# Patient Record
Sex: FEMALE | Race: WHITE | NOT HISPANIC OR LATINO | Employment: FULL TIME | URBAN - METROPOLITAN AREA
[De-identification: names, ages, dates, MRNs, and addresses within clinical notes are randomized per-mention and may not be internally consistent; named-entity substitution may affect disease eponyms.]

---
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
## 2019-01-09 ENCOUNTER — OFFICE VISIT (OUTPATIENT)
Dept: FAMILY MEDICINE | Facility: CLINIC | Age: 19
End: 2019-01-09
Payer: COMMERCIAL

## 2019-01-09 VITALS
BODY MASS INDEX: 20.65 KG/M2 | TEMPERATURE: 97.7 F | WEIGHT: 112.2 LBS | SYSTOLIC BLOOD PRESSURE: 110 MMHG | OXYGEN SATURATION: 100 % | HEART RATE: 106 BPM | HEIGHT: 62 IN | RESPIRATION RATE: 16 BRPM | DIASTOLIC BLOOD PRESSURE: 60 MMHG

## 2019-01-09 DIAGNOSIS — N76.0 VAGINITIS AND VULVOVAGINITIS: Primary | ICD-10-CM

## 2019-01-09 LAB
ALBUMIN UR-MCNC: NEGATIVE MG/DL
APPEARANCE UR: ABNORMAL
BILIRUB UR QL STRIP: NEGATIVE
COLOR UR AUTO: YELLOW
GLUCOSE UR STRIP-MCNC: NEGATIVE MG/DL
HGB UR QL STRIP: NEGATIVE
KETONES UR STRIP-MCNC: 80 MG/DL
LEUKOCYTE ESTERASE UR QL STRIP: NEGATIVE
MUCOUS THREADS #/AREA URNS LPF: PRESENT /LPF
NITRATE UR QL: NEGATIVE
PH UR STRIP: 5 PH (ref 5–7)
RBC #/AREA URNS AUTO: 1 /HPF (ref 0–2)
SOURCE: ABNORMAL
SP GR UR STRIP: 1.02 (ref 1–1.03)
SQUAMOUS #/AREA URNS AUTO: 3 /HPF (ref 0–1)
UROBILINOGEN UR STRIP-MCNC: 0 MG/DL (ref 0–2)
WBC #/AREA URNS AUTO: 1 /HPF (ref 0–5)

## 2019-01-09 PROCEDURE — 99213 OFFICE O/P EST LOW 20 MIN: CPT | Performed by: FAMILY MEDICINE

## 2019-01-09 PROCEDURE — 81001 URINALYSIS AUTO W/SCOPE: CPT | Performed by: FAMILY MEDICINE

## 2019-01-09 RX ORDER — FLUCONAZOLE 150 MG/1
150 TABLET ORAL ONCE
Qty: 1 TABLET | Refills: 0 | Status: SHIPPED | OUTPATIENT
Start: 2019-01-09 | End: 2019-04-30

## 2019-01-09 RX ORDER — PIMECROLIMUS 1 %
CREAM (GRAM) TOPICAL
Refills: 1 | COMMUNITY
Start: 2018-07-01 | End: 2019-04-30

## 2019-01-09 RX ORDER — DOXYCYCLINE 100 MG/1
100 CAPSULE ORAL 2 TIMES DAILY
Refills: 2 | COMMUNITY
Start: 2018-10-09 | End: 2019-04-30

## 2019-01-09 ASSESSMENT — PAIN SCALES - GENERAL: PAINLEVEL: NO PAIN (0)

## 2019-01-09 ASSESSMENT — MIFFLIN-ST. JEOR: SCORE: 1237.19

## 2019-01-09 NOTE — NURSING NOTE
Chief Complaint   Patient presents with     Vaginal Problem     x4 days     Health Maintenance Due   Topic Date Due     HPV IMMUNIZATION (1 - Female 3-dose series) 03/07/2010     PHQ-2 Q1 YR  03/07/2011     MENINGITIS IMMUNIZATION (1 - 2-dose series) 03/07/2015     HIV SCREEN (SYSTEM ASSIGNED)  03/07/2017     CHLAMYDIA SCREENING  06/16/2017     DTAP/TDAP/TD IMMUNIZATION (5 - Td) 08/30/2017     INFLUENZA VACCINE (1) 09/01/2018     Chaka Dave, [facility]

## 2019-01-09 NOTE — PROGRESS NOTES
SUBJECTIVE:   Liz Peter is a 18 year old female who presents to clinic today for the following health issues:    Vaginal Symptoms  Onset: 4 days    Description:  Vaginal Discharge: none   Itching (Pruritis): YES  Burning sensation:  YES- when urinating  Odor: no     Accompanying Signs & Symptoms:  Pain with Urination: YES- burning  Abdominal Pain: no   Fever: no     History:   Sexually active: YES  New Partner: no   Possibility of Pregnancy:  She has the depo injection    Precipitating factors:   Recent Antibiotic Use: YES- doxycycline    Alleviating factors:  Coconut oil helped with burning    Therapies Tried and outcome: coconut oil but it did not get rid of it    Liz is here today with a concern of possible yeast infection vs bladder infection.  Nursing notes above reviewed and confirmed with patient.  Has 4-day history is of intense vaginal itching and dyspareunia and they are not getting any better.  She feels irritated and itchy deep inside the birth canal.  Minimal irritation in the outside even with cleaning.  Never had this before.  No abnormal discharge however.  No strong or fishy odor or cartilages consistent discharge.  Feel slight burning sensation with urination.  No urine frequency or urgency.  No obvious hematuria.  No low back or abdominal pain.  She is on Depo-Provera injection and therefore has no menstrual bleeding.  Has been on doxycycline twice a day for skin infection a month and still has another month at the take.  No concern of STD or its risk - been in a monogamous relationship with her boyfriend of 3 years.  No N/V/D/C.  No fever or chills.  No otherwise healthy and no has other concerns.    Problem list and histories reviewed & adjusted, as indicated.  Additional history: as documented    Current Outpatient Medications   Medication Sig Dispense Refill     doxycycline hyclate (VIBRAMYCIN) 100 MG capsule Take 100 mg by mouth 2 times daily  2     ELIDEL 1 % external cream APPLY  "TO AFFECTED AREA(S) THIN LAYER TOPICALLY TWO TIMES A DAY RUB IN GENTLY AND COMPLETELY  1     medroxyPROGESTERone (DEPO-PROVERA) 150 MG/ML injection Inject 1 mL (150 mg) into the muscle every 3 months 3 mL 3       Reviewed and updated as needed this visit by clinical staff  Tobacco  Allergies  Meds  Soc Hx      Reviewed and updated as needed this visit by Provider         ROS:  Constitutional, HEENT, cardiovascular, pulmonary, gi and gu systems are negative, except as otherwise noted.    OBJECTIVE:     /60 (BP Location: Right arm, Patient Position: Sitting, Cuff Size: Adult Regular)   Pulse 106   Temp 97.7  F (36.5  C) (Temporal)   Resp 16   Ht 1.575 m (5' 2\")   Wt 50.9 kg (112 lb 3.2 oz)   SpO2 100%   Breastfeeding? No   BMI 20.52 kg/m    Body mass index is 20.52 kg/m .  GENERAL: healthy, alert and no distress  RESP: lungs clear to auscultation - no rales, rhonchi or wheezes  CV: regular rate and rhythm, no murmur.  ABDOMEN: soft, nontender, nondistended, no palpable masses or organomegaly with normal bowel sounds.  No CVA tenderness   (female): Offered and recommended but she wanted to hold it off for now.    Diagnostic Test Results:  Results for orders placed or performed in visit on 01/09/19 (from the past 24 hour(s))   UA with Microscopic reflex to Culture ([place]; [facility])   Result Value Ref Range    Color Urine Yellow     Appearance Urine Slightly Cloudy     Glucose Urine Negative NEG^Negative mg/dL    Bilirubin Urine Negative NEG^Negative    Ketones Urine 80 (A) NEG^Negative mg/dL    Specific Gravity Urine 1.025 1.003 - 1.035    Blood Urine Negative NEG^Negative    pH Urine 5.0 5.0 - 7.0 pH    Protein Albumin Urine Negative NEG^Negative mg/dL    Urobilinogen mg/dL 0.0 0.0 - 2.0 mg/dL    Nitrite Urine Negative NEG^Negative    Leukocyte Esterase Urine Negative NEG^Negative    Source Unspecified Urine     WBC Urine 1 0 - 5 /HPF    RBC Urine 1 0 - 2 /HPF    Squamous Epithelial " /HPF Urine 3 (H) 0 - 1 /HPF    Mucous Urine Present (A) NEG^Negative /LPF       ASSESSMENT/PLAN:       ICD-10-CM    1. Vaginitis and vulvovaginitis N76.0 fluconazole (DIFLUCAN) 150 MG tablet     UA with Microscopic reflex to Culture (Suzanne Das; [facility])     CANCELED: UA with Microscopic reflex to Culture     Informed her that based on the clinical presentations and her being on antibiotic in the last month, most likely she has yeast vaginitis.  The UA was normal, unlikely UTI.  Discussed with her about the option of getting a pelvic exam with a wet prep versus treat empirically for yeast infection.  Pros and cons of each option discussed.  Informed her that, if she not respond to the treatment for yeast infection, she then will have to come back for a pelvic exam if she chose not to have one done today.  She elected to be treated empirically for yeast infection with Diflucan; side effect discussed.  Also recommend to consider tested for STD if the symptoms not improve or persist with the treatment.  She has no concern about it but willing to consider that at that time.  She is on reliable birth control method, I do not feel the need to check for her pregnancy test today.  Encouraged her to avoid excessive wiping or cleaning to the private area as it worsen the irritation.  Follow-up if the symptoms persist or get worse with the Diflucan.  She feels comfortable with the plan and all of her questions were answered.    Elsie Paz Mai, MD  [facility]

## 2019-03-08 ENCOUNTER — ALLIED HEALTH/NURSE VISIT (OUTPATIENT)
Dept: FAMILY MEDICINE | Facility: CLINIC | Age: 19
End: 2019-03-08
Payer: COMMERCIAL

## 2019-03-08 PROCEDURE — 96372 THER/PROPH/DIAG INJ SC/IM: CPT

## 2019-03-08 NOTE — PROGRESS NOTES
Prior to injection, verified patient identity using patient's name and date of birth.  Due to injection administration, patient instructed to remain in clinic for 15 minutes  afterwards, and to report any adverse reaction to me immediately.    BP: Data Unavailable    LAST PAP/EXAM: No results found for: PAP  URINE HCG:not indicated    NEXT INJECTION DUE: 5/24/19 - 6/7/19       Drug Amount Wasted:  None.  Vial/Syringe: Single dose vial  Expiration Date:  05/2020

## 2019-04-30 ENCOUNTER — OFFICE VISIT (OUTPATIENT)
Dept: URGENT CARE | Facility: RETAIL CLINIC | Age: 19
End: 2019-04-30
Payer: COMMERCIAL

## 2019-04-30 VITALS
OXYGEN SATURATION: 100 % | HEART RATE: 84 BPM | SYSTOLIC BLOOD PRESSURE: 105 MMHG | TEMPERATURE: 98.6 F | DIASTOLIC BLOOD PRESSURE: 70 MMHG

## 2019-04-30 DIAGNOSIS — T14.8XXA INFECTED ABRASION: Primary | ICD-10-CM

## 2019-04-30 DIAGNOSIS — L08.9 INFECTED ABRASION: Primary | ICD-10-CM

## 2019-04-30 PROCEDURE — 99213 OFFICE O/P EST LOW 20 MIN: CPT | Performed by: INTERNAL MEDICINE

## 2019-04-30 RX ORDER — SULFAMETHOXAZOLE/TRIMETHOPRIM 800-160 MG
1 TABLET ORAL 2 TIMES DAILY
Qty: 20 TABLET | Refills: 0 | Status: SHIPPED | OUTPATIENT
Start: 2019-04-30 | End: 2019-09-06

## 2019-04-30 RX ORDER — MUPIROCIN 20 MG/G
OINTMENT TOPICAL 3 TIMES DAILY
Qty: 22 G | Refills: 0 | Status: SHIPPED | OUTPATIENT
Start: 2019-04-30 | End: 2019-09-06

## 2019-04-30 NOTE — PROGRESS NOTES
"[facility] Care Progress Note        Gabriel Aquino MD, MPH  04/30/2019    Liz Peter is a pleasant  19  year old female seen for mild pain and a mildly pruritic skin lesion involving right ear lobe w/o drainage \" a week and a half ago\" . There has not been any change in the diet or new detergent, soap or toiletries.  No new medications. No insect bite is referred. However the patient's boyfriend has suggested an insect bite ( possibly spider bite) when the patient symptoms began. No fever or chills and no recent illness. No cough or shortness of breath or wheezing is referred.  No nausea, vomiting or diarrhea.  No arthralgia or myalgia.  No tongue, lip or mouth swelling or swallowing problems are referred.    Physical Exam   /70   Pulse 84   Temp 98.6  F (37  C) (Oral)   SpO2 100%      Constitutional: Patient is in no distress The patient is pleasant and cooperative.   HEENT: Head:  Head is atraumatic, normocephalic.    Eyes: Pupils are equal, round and reactive to light and accomodation.  Sclera is non-icteric. No conjunctival injection, or exudate noted. Extraocular motion is intact. Visual acuity is intact bilaterally.  Ears:  External acoustic canals are patent and clear.  There is no erythema and bulging( exudate)  of bilateral tympanic membranes. Please see SKIN SECTION for more detail.  Nose:  No Nasal congestion w/o drainage or mucosal ulceration is noted.  Throat:  Oral mucosa is moist.  No oral lesions are noted.  No posterior pharyngeal hyperemia or exudate noted.     Neck Supple.  There is no cervical lymphadenopathy. No postauricular lymphadenopathy noted. No nuchal rigidity noted.  There is no meningismus.     Cardiovascular: Heart is regular to rate and rhythm.  No murmur is noted.     Chest. Chest Symmetrical, no soft tissues, swelling, or tenderness upon palpation   Lungs: Clear in the anterior and posterior pulmonary fields.   Abdomen: Soft and non-tender.    Back " No flank tenderness is noted.   Extremeties No edema, no calf tenderness.   Neuro: No focal deficit.   Skin No petechiae or purpura is noted.  There is a small area of skin abrasion involving the upper margin of the right ear lobe specifically the upper segment  of the brandin with surrounding erythema  W/o fulminant cellulitis. No current cutaneous abscess is noted and there is no evidence of lymphangitis. No drainage of exudative ( purulent ) materilal is noted. No ulceration. No pain is elicited upon gentle tug on the right tragus/pinna. There is no swelling of the right EAC proper and no erythema or exudate of right TM is noted. No right middle ear effusion is noted. No swelling or erythema of the Mastoid processes noted. Hearing acuity is intact bilaterally.       Mood Normal         Assessment & Plan       Infected abrasion of right Ear lobe ( brandin) with impending cellulitis ; No cutaneous abscess currently.    - mupirocin (BACTROBAN) 2 % external ointment; Apply topically 3 times daily Apply topically 3 times a day  By mouth for 1 week.  Dispense: 22 g; Refill: 0  - sulfamethoxazole-trimethoprim (BACTRIM DS/SEPTRA DS) 800-160 MG tablet; Take 1 tablet by mouth 2 times daily for 10 days  Dispense: 20 tablet; Refill: 0     Follow-up with your PCP in 2-3 days, earlier if symptoms worsen.  Advised to wash the area of abrasion of right ear lobe w soap and water gently and apply warm wash cloth ( avoid applying HOT wash cloth lest burning could ensue) 2-3 times a day.   Acetaminophen 650 mg or Ibuprofen 400 mg by mouth every 6 hours as needed.

## 2019-05-24 ENCOUNTER — ALLIED HEALTH/NURSE VISIT (OUTPATIENT)
Dept: FAMILY MEDICINE | Facility: CLINIC | Age: 19
End: 2019-05-24
Payer: COMMERCIAL

## 2019-05-24 DIAGNOSIS — Z30.013 ENCOUNTER FOR INITIAL PRESCRIPTION OF INJECTABLE CONTRACEPTIVE: Primary | ICD-10-CM

## 2019-05-24 PROCEDURE — 96372 THER/PROPH/DIAG INJ SC/IM: CPT

## 2019-05-24 NOTE — PROGRESS NOTES
Prior to injection, verified patient identity using patient's name and date of birth.  Due to injection administration, patient instructed to remain in clinic for 15 minutes  afterwards, and to report any adverse reaction to me immediately.    BP: Data Unavailable    LAST PAP/EXAM: No results found for: PAP  URINE HCG:not indicated    NEXT INJECTION DUE: 8/9/19 - 8/23/19         Drug Amount Wasted:  None.  Vial/Syringe: Single dose vial  Expiration Date:  04/2020  Emy Dean CMA

## 2019-08-16 ENCOUNTER — ALLIED HEALTH/NURSE VISIT (OUTPATIENT)
Dept: FAMILY MEDICINE | Facility: CLINIC | Age: 19
End: 2019-08-16
Payer: MEDICAID

## 2019-08-16 DIAGNOSIS — N94.6 DYSMENORRHEA: Primary | ICD-10-CM

## 2019-08-16 PROCEDURE — 99207 ZZC NO CHARGE NURSE ONLY: CPT

## 2019-08-16 PROCEDURE — 96372 THER/PROPH/DIAG INJ SC/IM: CPT

## 2019-08-16 RX ORDER — MEDROXYPROGESTERONE ACETATE 150 MG/ML
150 INJECTION, SUSPENSION INTRAMUSCULAR ONCE
Status: COMPLETED | OUTPATIENT
Start: 2019-08-16 | End: 2019-08-16

## 2019-08-16 RX ORDER — MEDROXYPROGESTERONE ACETATE 150 MG/ML
150 INJECTION, SUSPENSION INTRAMUSCULAR ONCE
Status: CANCELLED | OUTPATIENT
Start: 2019-08-16

## 2019-08-16 RX ADMIN — MEDROXYPROGESTERONE ACETATE 150 MG: 150 INJECTION, SUSPENSION INTRAMUSCULAR at 09:08

## 2019-08-16 NOTE — PROGRESS NOTES
.Clinic Administered Medication Documentation      Depo Provera Documentation    Prior to injection, verified patient identity using patient's name and date of birth. Medication was administered. Please see MAR and medication order for additional information. Patient instructed to remain in clinic for 15 minutes and report any adverse reaction to staff immediately .    BP: Data Unavailable    LAST PAP/EXAM: No results found for: PAP  URINE HCG:not indicated    NEXT INJECTION DUE: 11/1/19 - 11/15/19    Was entire vial of medication used? Yes  Vial/Syringe: Single dose vial  Expiration Date:  02/20  Right Deltoid    Patient was informed that she is due for physical. Offered to schedule an appointment for the patient but she declined. She stated that she will call in to schedule the appointment.     Chaka Dave, CMA

## 2019-09-06 ENCOUNTER — OFFICE VISIT (OUTPATIENT)
Dept: FAMILY MEDICINE | Facility: CLINIC | Age: 19
End: 2019-09-06
Payer: MEDICAID

## 2019-09-06 VITALS
TEMPERATURE: 98.2 F | RESPIRATION RATE: 20 BRPM | HEIGHT: 62 IN | HEART RATE: 74 BPM | BODY MASS INDEX: 20.24 KG/M2 | SYSTOLIC BLOOD PRESSURE: 106 MMHG | OXYGEN SATURATION: 99 % | WEIGHT: 110 LBS | DIASTOLIC BLOOD PRESSURE: 60 MMHG

## 2019-09-06 DIAGNOSIS — Z30.42 ENCOUNTER FOR SURVEILLANCE OF INJECTABLE CONTRACEPTIVE: ICD-10-CM

## 2019-09-06 DIAGNOSIS — Z11.3 SCREENING EXAMINATION FOR VENEREAL DISEASE: ICD-10-CM

## 2019-09-06 DIAGNOSIS — Z00.00 ROUTINE GENERAL MEDICAL EXAMINATION AT A HEALTH CARE FACILITY: Primary | ICD-10-CM

## 2019-09-06 PROCEDURE — 87591 N.GONORRHOEAE DNA AMP PROB: CPT | Performed by: FAMILY MEDICINE

## 2019-09-06 PROCEDURE — 99395 PREV VISIT EST AGE 18-39: CPT | Performed by: FAMILY MEDICINE

## 2019-09-06 PROCEDURE — 87491 CHLMYD TRACH DNA AMP PROBE: CPT | Performed by: FAMILY MEDICINE

## 2019-09-06 RX ORDER — MEDROXYPROGESTERONE ACETATE 150 MG/ML
150 INJECTION, SUSPENSION INTRAMUSCULAR
Status: ACTIVE | OUTPATIENT
Start: 2019-11-01

## 2019-09-06 ASSESSMENT — ENCOUNTER SYMPTOMS
SORE THROAT: 0
EYE PAIN: 0
HEARTBURN: 0
MYALGIAS: 0
SHORTNESS OF BREATH: 0
HEADACHES: 0
NAUSEA: 0
COUGH: 0
HEMATOCHEZIA: 0
ARTHRALGIAS: 0
HEMATURIA: 0
FEVER: 0
BREAST MASS: 0
DIARRHEA: 0
NERVOUS/ANXIOUS: 0
CHILLS: 0
DYSURIA: 0
ABDOMINAL PAIN: 0
WEAKNESS: 0
CONSTIPATION: 0
PARESTHESIAS: 0
DIZZINESS: 0
FREQUENCY: 0
PALPITATIONS: 0
JOINT SWELLING: 0

## 2019-09-06 ASSESSMENT — MIFFLIN-ST. JEOR: SCORE: 1222.21

## 2019-09-06 ASSESSMENT — PAIN SCALES - GENERAL: PAINLEVEL: NO PAIN (0)

## 2019-09-06 NOTE — PROGRESS NOTES
SUBJECTIVE:   CC: Liz Peter is an 19 year old woman who presents for preventive health visit.     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          Today's PHQ-2 Score:   PHQ-2 ( 1999 Pfizer) 9/6/2019   Q1: Little interest or pleasure in doing things 0   Q2: Feeling down, depressed or hopeless 0   PHQ-2 Score 0   Q1: Little interest or pleasure in doing things Not at all   Q2: Feeling down, depressed or hopeless Not at all   PHQ-2 Score 0       Abuse: Current or Past(Physical, Sexual or Emotional)- No  Do you feel safe in your environment? Yes    Social History     Tobacco Use     Smoking status: Never Smoker     Smokeless tobacco: Never Used   Substance Use Topics     Alcohol use: No     Alcohol/week: 0.0 oz         Alcohol Use 9/6/2019   Prescreen: >3 drinks/day or >7 drinks/week? No       Reviewed orders with patient.  Reviewed health maintenance and updated orders accordingly - Yes      Mammogram not appropriate for this patient based on age.    Pertinent mammograms are reviewed under the imaging tab.  History of abnormal Pap smear: NO - under age 21, PAP not appropriate for age     Reviewed and updated as needed this visit by clinical staff  Tobacco  Allergies  Meds  Problems  Med Hx  Surg Hx  Fam Hx  Soc Hx          Reviewed and updated as needed this visit by Provider  Tobacco  Allergies  Meds  Problems  Med Hx  Surg Hx  Fam Hx            Review of Systems   Constitutional: Negative for chills and fever.   HENT: Negative  "for congestion, ear pain, hearing loss and sore throat.    Eyes: Negative for pain and visual disturbance.   Respiratory: Negative for cough and shortness of breath.    Cardiovascular: Negative for chest pain, palpitations and peripheral edema.   Gastrointestinal: Negative for abdominal pain, constipation, diarrhea, heartburn, hematochezia and nausea.   Breasts:  Negative for tenderness, breast mass and discharge.   Genitourinary: Negative for dysuria, frequency, genital sores, hematuria, pelvic pain, urgency, vaginal bleeding and vaginal discharge.   Musculoskeletal: Negative for arthralgias, joint swelling and myalgias.   Skin: Negative for rash.   Neurological: Negative for dizziness, weakness, headaches and paresthesias.   Psychiatric/Behavioral: Negative for mood changes. The patient is not nervous/anxious.           OBJECTIVE:   /60   Pulse 74   Temp 98.2  F (36.8  C) (Temporal)   Resp 20   Ht 1.575 m (5' 2\")   Wt 49.9 kg (110 lb)   LMP 03/06/2019   SpO2 99%   Breastfeeding? No   BMI 20.12 kg/m    Physical Exam   Constitutional: She is oriented to person, place, and time. Vital signs are normal. She appears well-developed and well-nourished. No distress.   HENT:   Right Ear: Hearing, tympanic membrane, external ear and ear canal normal.   Left Ear: Hearing, tympanic membrane, external ear and ear canal normal.   Nose: Nose normal.   Mouth/Throat: Uvula is midline, oropharynx is clear and moist and mucous membranes are normal. No oropharyngeal exudate or posterior oropharyngeal erythema.   Eyes: Pupils are equal, round, and reactive to light. Conjunctivae, EOM and lids are normal. Right eye exhibits no discharge. Left eye exhibits no discharge.   Neck: Normal range of motion. Neck supple. No tracheal deviation present. No thyromegaly present.   Cardiovascular: Normal rate, regular rhythm, S1 normal, S2 normal, normal heart sounds and normal pulses. Exam reveals no S3, no S4 and no friction rub. " "  No murmur heard.  Pulmonary/Chest: Effort normal and breath sounds normal. No respiratory distress. She has no wheezes. She has no rales.   Abdominal: Soft. Normal appearance and bowel sounds are normal. She exhibits no mass. There is no hepatosplenomegaly. There is no tenderness.   Musculoskeletal: Normal range of motion. She exhibits no edema.   Lymphadenopathy:     She has no cervical adenopathy.        Right: No supraclavicular adenopathy present.        Left: No supraclavicular adenopathy present.   Neurological: She is alert and oriented to person, place, and time. She has normal strength and normal reflexes. No cranial nerve deficit or sensory deficit. She exhibits normal muscle tone.   Skin: Skin is warm, dry and intact. No rash noted.   Psychiatric: She has a normal mood and affect. Judgment and thought content normal. Cognition and memory are normal.             ASSESSMENT/PLAN:       ICD-10-CM    1. Routine general medical examination at a health care facility Z00.00    2. Encounter for surveillance of injectable contraceptive Z30.42 medroxyPROGESTERone (DEPO-PROVERA) injection 150 mg   3. Screening examination for venereal disease Z11.3 NEISSERIA GONORRHOEA PCR     CHLAMYDIA TRACHOMATIS PCR       COUNSELING:  Reviewed preventive health counseling, as reflected in patient instructions    Estimated body mass index is 20.12 kg/m  as calculated from the following:    Height as of this encounter: 1.575 m (5' 2\").    Weight as of this encounter: 49.9 kg (110 lb).         reports that she has never smoked. She has never used smokeless tobacco.      Counseling Resources:  ATP IV Guidelines  Pooled Cohorts Equation Calculator  Breast Cancer Risk Calculator  FRAX Risk Assessment  ICSI Preventive Guidelines  Dietary Guidelines for Americans, 2010  USDA's MyPlate  ASA Prophylaxis  Lung CA Screening    Arcadio Malone MD  [facility]  "

## 2019-09-09 ENCOUNTER — ALLIED HEALTH/NURSE VISIT (OUTPATIENT)
Dept: FAMILY MEDICINE | Facility: CLINIC | Age: 19
End: 2019-09-09
Payer: MEDICAID

## 2019-09-09 DIAGNOSIS — Z23 NEED FOR PROPHYLACTIC VACCINATION AND INOCULATION AGAINST INFLUENZA: Primary | ICD-10-CM

## 2019-09-09 DIAGNOSIS — Z11.1 SCREENING EXAMINATION FOR PULMONARY TUBERCULOSIS: ICD-10-CM

## 2019-09-09 PROCEDURE — 90686 IIV4 VACC NO PRSV 0.5 ML IM: CPT

## 2019-09-09 PROCEDURE — 90471 IMMUNIZATION ADMIN: CPT

## 2019-09-09 PROCEDURE — 86580 TB INTRADERMAL TEST: CPT

## 2019-09-09 PROCEDURE — 99207 ZZC NO CHARGE NURSE ONLY: CPT

## 2019-09-10 DIAGNOSIS — A74.9 CHLAMYDIA: Primary | ICD-10-CM

## 2019-09-10 LAB
C TRACH DNA SPEC QL NAA+PROBE: POSITIVE
N GONORRHOEA DNA SPEC QL NAA+PROBE: NEGATIVE
SPECIMEN SOURCE: ABNORMAL
SPECIMEN SOURCE: NORMAL

## 2019-09-10 RX ORDER — AZITHROMYCIN 500 MG/1
1000 TABLET, FILM COATED ORAL ONCE
Qty: 2 TABLET | Refills: 0 | Status: SHIPPED | OUTPATIENT
Start: 2019-09-10 | End: 2019-09-10

## 2019-09-10 NOTE — TELEPHONE ENCOUNTER
Liz returns call and message per Dr Malone is relayed to her. Liz states understanding and did not have any other questions or concerns.    [facility] [place].

## 2019-09-10 NOTE — TELEPHONE ENCOUNTER
Patient has positive Clamydia test.  Needs azithromycin treatment.  I attempted to call but there was no identifying information for me to leave a voicemail.  Will need to try back again later.      I will need to know what pharmacy to send her prescription to.  She also needs to know that any of her sex partners will need to be notified and that they will need treatment and she and any partners will need 7 days post treatment before having sex again.    Will have staff try contacting her again later.    Arcadio Malone MD

## 2019-09-12 ENCOUNTER — ALLIED HEALTH/NURSE VISIT (OUTPATIENT)
Dept: FAMILY MEDICINE | Facility: CLINIC | Age: 19
End: 2019-09-12
Payer: MEDICAID

## 2019-09-12 DIAGNOSIS — Z11.1 SCREENING EXAMINATION FOR PULMONARY TUBERCULOSIS: Primary | ICD-10-CM

## 2019-09-12 LAB
PPDINDURATION: 0 MM (ref 0–5)
PPDREDNESS: 0 MM

## 2019-09-12 PROCEDURE — 99207 ZZC NO CHARGE NURSE ONLY: CPT

## 2019-09-16 ENCOUNTER — ALLIED HEALTH/NURSE VISIT (OUTPATIENT)
Dept: FAMILY MEDICINE | Facility: CLINIC | Age: 19
End: 2019-09-16
Payer: MEDICAID

## 2019-09-16 DIAGNOSIS — Z11.1 SCREENING EXAMINATION FOR PULMONARY TUBERCULOSIS: Primary | ICD-10-CM

## 2019-09-16 PROCEDURE — 86580 TB INTRADERMAL TEST: CPT

## 2019-09-16 PROCEDURE — 99207 ZZC NO CHARGE NURSE ONLY: CPT

## 2019-09-16 NOTE — PROGRESS NOTES
The patient is asked the following questions today and these are her answers:    -Have you had a mantoux administered in the past 30 days?    Yes  -Have you had a previous positive Mantoux.  No  -Have you received BCG in the past.  No  -Have you had a live vaccine  (MMR, Varicella, OPV, Yellow Fever) in the last 6 weeks.  No  -Have you had and active  viral or bacterial infection in the past 6 weeks.  No  -Have you received corticosteroids or immunosuppressive agents in the past 6 weeks.  No  -Have you been diagnosed with HIV?  No  -Do you have a malignancy?  No    Mantoux Questionnaire: answers were all negative but one.    This is a 2 step, so patient had one done on 9/9/2019. Okay to give.

## 2019-09-18 ENCOUNTER — ALLIED HEALTH/NURSE VISIT (OUTPATIENT)
Dept: FAMILY MEDICINE | Facility: CLINIC | Age: 19
End: 2019-09-18
Payer: MEDICAID

## 2019-09-18 DIAGNOSIS — Z11.1 SCREENING EXAMINATION FOR PULMONARY TUBERCULOSIS: Primary | ICD-10-CM

## 2019-09-18 LAB
PPDINDURATION: 0 MM (ref 0–5)
PPDREDNESS: 0 MM

## 2019-09-18 PROCEDURE — 99207 ZZC NO CHARGE NURSE ONLY: CPT

## 2019-09-23 ENCOUNTER — ALLIED HEALTH/NURSE VISIT (OUTPATIENT)
Dept: FAMILY MEDICINE | Facility: CLINIC | Age: 19
End: 2019-09-23
Payer: MEDICAID

## 2019-09-23 DIAGNOSIS — Z11.1 SCREENING EXAMINATION FOR PULMONARY TUBERCULOSIS: Primary | ICD-10-CM

## 2019-09-23 PROCEDURE — 86580 TB INTRADERMAL TEST: CPT

## 2019-09-23 PROCEDURE — 99207 ZZC NO CHARGE NURSE ONLY: CPT

## 2019-09-23 NOTE — PROGRESS NOTES
The patient is asked the following questions today and these are her answers:    -Have you had a mantoux administered in the past 30 days?    Yes  -Have you had a previous positive Mantoux.  No  -Have you received BCG in the past.  No  -Have you had a live vaccine  (MMR, Varicella, OPV, Yellow Fever) in the last 6 weeks.  No  -Have you had and active  viral or bacterial infection in the past 6 weeks.  No  -Have you received corticosteroids or immunosuppressive agents in the past 6 weeks.  No  -Have you been diagnosed with HIV?  No  -Do you have a malignancy?  No    Mantoux Questionnaire: was positive for at least one answer.  Notified had mantoux administered on 9/16/19.

## 2019-09-26 ENCOUNTER — ALLIED HEALTH/NURSE VISIT (OUTPATIENT)
Dept: FAMILY MEDICINE | Facility: CLINIC | Age: 19
End: 2019-09-26
Payer: MEDICAID

## 2019-09-26 DIAGNOSIS — Z11.1 SCREENING EXAMINATION FOR PULMONARY TUBERCULOSIS: Primary | ICD-10-CM

## 2019-09-26 LAB
PPDINDURATION: 0 MM (ref 0–5)
PPDREDNESS: 0 MM

## 2019-09-26 PROCEDURE — 99207 ZZC NO CHARGE NURSE ONLY: CPT

## 2019-11-11 ENCOUNTER — ALLIED HEALTH/NURSE VISIT (OUTPATIENT)
Dept: FAMILY MEDICINE | Facility: CLINIC | Age: 19
End: 2019-11-11
Payer: COMMERCIAL

## 2019-11-11 DIAGNOSIS — Z30.42 ENCOUNTER FOR SURVEILLANCE OF INJECTABLE CONTRACEPTIVE: Primary | ICD-10-CM

## 2019-11-11 PROCEDURE — 96372 THER/PROPH/DIAG INJ SC/IM: CPT

## 2019-11-11 PROCEDURE — 99207 ZZC NO CHARGE NURSE ONLY: CPT

## 2019-11-11 RX ADMIN — MEDROXYPROGESTERONE ACETATE 150 MG: 150 INJECTION, SUSPENSION INTRAMUSCULAR at 13:08

## 2019-11-11 NOTE — PROGRESS NOTES
Clinic Administered Medication Documentation    MEDICATION LIST:   Depo Provera Documentation    Prior to injection, verified patient identity using patient's name and date of birth. Medication was administered. Please see MAR and medication order for additional information. Patient instructed to remain in clinic for 15 minutes and report any adverse reaction to staff immediately .    BP: Data Unavailable    LAST PAP/EXAM: No results found for: PAP  URINE HCG:not indicated    NEXT INJECTION DUE: 1/27/20 - 2/10/20    Was entire vial of medication used? Yes  Vial/Syringe: Single dose vial  Expiration Date:  03/21  Location: left Deltoid    Chaka Dave CMA

## 2020-01-03 ENCOUNTER — OFFICE VISIT (OUTPATIENT)
Dept: URGENT CARE | Facility: RETAIL CLINIC | Age: 20
End: 2020-01-03
Payer: COMMERCIAL

## 2020-01-03 VITALS
HEART RATE: 85 BPM | TEMPERATURE: 98.3 F | DIASTOLIC BLOOD PRESSURE: 73 MMHG | RESPIRATION RATE: 16 BRPM | OXYGEN SATURATION: 100 % | SYSTOLIC BLOOD PRESSURE: 120 MMHG

## 2020-01-03 DIAGNOSIS — J02.9 ACUTE PHARYNGITIS, UNSPECIFIED ETIOLOGY: Primary | ICD-10-CM

## 2020-01-03 LAB — S PYO AG THROAT QL IA.RAPID: NORMAL

## 2020-01-03 PROCEDURE — 99213 OFFICE O/P EST LOW 20 MIN: CPT | Performed by: INTERNAL MEDICINE

## 2020-01-03 PROCEDURE — 87081 CULTURE SCREEN ONLY: CPT | Performed by: INTERNAL MEDICINE

## 2020-01-03 PROCEDURE — 87880 STREP A ASSAY W/OPTIC: CPT | Mod: QW | Performed by: INTERNAL MEDICINE

## 2020-01-03 NOTE — LETTER
[facility]  [address] [facility] [address]  Phone: [phone]    January 3, 2020        Liz Peter  [id number] [facility] [address]          To whom it may concern:    RE: Liz Peter    Patient was seen and treated today at our clinic. Advised to rest today and the next 2 days.    Please contact me for questions or concerns.      Sincerely,        Gabriel Aquino MD,MPH

## 2020-01-03 NOTE — PROGRESS NOTES
[facility] Care Progress Note        Gabriel Aquino MD, MPH  01/03/2020        History:      Liz Peter is a pleasant 19 year old year old female with a chief complaint of nasal congestion and sore throat for 5 days.  No fever or chills.   No dyspnea or wheezing.   No smoking history.   No headache or neck pain.  No GI or  symptoms.   No MSK symptoms.  No rash.         Assessment and Plan:        - RAPID STREP SCREEN: negative.  - BETA STREP GROUP A R/O CULTURE   URI:  Discussed supportive care with the patient  Advised to increase fluid intake and rest.  Patient was advised to use throat lozenges and gargle with salt water for symptomatic relief.  Tylenol 650 mg po for pain q 6 hours as needed.  F/u w PCP in 4-5 days, earlier if symptoms worsen.                   Physical Exam:      /73 (BP Location: Right arm, Patient Position: Sitting, Cuff Size: Adult Regular)   Pulse 85   Temp 98.3  F (36.8  C) (Oral)   Resp 16   SpO2 100%      Constitutional: Patient is in no distress The patient is pleasant and cooperative.   HEENT: Head:  Head is atraumatic, normocephalic.    Eyes: Pupils are equal, round and reactive to light and accomodation.  Sclera is non-icteric. No conjunctival injection, or exudate noted. Extraocular motion is intact. Visual acuity is intact bilaterally.  Ears:  External acoustic canals are patent and clear.  There is no erythema and bulging( exudate)  of the ( R/L ) tympanic membrane(s ).   Nose:  Nasal congestion w/o drainage or mucosal ulceration is noted.  Throat:  Oral mucosa is moist.  No oral lesions are noted. Posterior pharyngeal hyperemia w/o exudate noted.     Neck Supple.  There is no cervical lymphadenopathy.  No nuchal rigidity noted.  There is no meningismus.     Cardiovascular: Heart is regular to rate and rhythm.  No murmur is noted.     Lungs: Clear in the anterior and posterior pulmonary fields.   Abdomen: Soft and non-tender.    Back No flank  tenderness is noted.   Extremeties No edema, no calf tenderness.   Neuro: No focal deficit.   Skin No petechiae or purpura is noted.  There is no rash.   Mood Normal              Data:      All new lab and imaging data was reviewed.   Results for orders placed or performed in visit on 01/03/20   RAPID STREP SCREEN     Status: None   Result Value Ref Range    Rapid Strep A Screen NEG neg

## 2020-01-05 LAB
BACTERIA SPEC CULT: NORMAL
SPECIMEN SOURCE: NORMAL

## 2020-01-28 ENCOUNTER — ALLIED HEALTH/NURSE VISIT (OUTPATIENT)
Dept: FAMILY MEDICINE | Facility: CLINIC | Age: 20
End: 2020-01-28
Payer: COMMERCIAL

## 2020-01-28 VITALS — DIASTOLIC BLOOD PRESSURE: 70 MMHG | SYSTOLIC BLOOD PRESSURE: 110 MMHG

## 2020-01-28 DIAGNOSIS — Z30.42 ENCOUNTER FOR SURVEILLANCE OF INJECTABLE CONTRACEPTIVE: Primary | ICD-10-CM

## 2020-01-28 PROCEDURE — 99207 ZZC NO CHARGE NURSE ONLY: CPT

## 2020-01-28 PROCEDURE — 96372 THER/PROPH/DIAG INJ SC/IM: CPT

## 2020-01-28 RX ADMIN — MEDROXYPROGESTERONE ACETATE 150 MG: 150 INJECTION, SUSPENSION INTRAMUSCULAR at 13:52

## 2020-01-28 NOTE — PROGRESS NOTES
BP: 110/70    LAST PAP/EXAM: No results found for: PAP  URINE HCG:not indicated    The following medication was given:     MEDICATION: Depo Provera 150mg  ROUTE: IM  SITE: Deltoid - Right  : [place]   LOT #: [id number]  EXP:OCT 2020  NEXT INJECTION DUE: 4/14/20 - 4/28/20  PCP:  DR OLGA De La Cruz [facility]

## 2020-04-09 ENCOUNTER — VIRTUAL VISIT (OUTPATIENT)
Dept: FAMILY MEDICINE | Facility: CLINIC | Age: 20
End: 2020-04-09
Payer: COMMERCIAL

## 2020-04-09 DIAGNOSIS — Z86.19 HISTORY OF CHLAMYDIA: Primary | ICD-10-CM

## 2020-04-09 PROCEDURE — 99442 ZZC PHYSICIAN TELEPHONE EVALUATION 11-20 MIN: CPT | Performed by: FAMILY MEDICINE

## 2020-04-09 NOTE — PROGRESS NOTES
"Subjective     Liz Peter is a 20 year old female who is being evaluated via a billable telephone visit.      The patient has been notified of following:     \"This telephone visit will be conducted via a call between you and your physician/provider. We have found that certain health care needs can be provided without the need for a physical exam.  This service lets us provide the care you need with a short phone conversation.  If a prescription is necessary we can send it directly to your pharmacy.  If lab work is needed we can place an order for that and you can then stop by our lab to have the test done at a later time.    Telephone visits are billed at different rates depending on your insurance coverage. During this emergency period, for some insurers they may be billed the same as an in-person visit.  Please reach out to your insurance provider with any questions.    If during the course of the call the physician/provider feels a telephone visit is not appropriate, you will not be charged for this service.\"    Patient has given verbal consent for Telephone visit?  Yes    How would you like to obtain your AVS? Declined    Telephone visit performed in lieu of an in-person visit due to current concerns regarding the current COVID-19 situation including social distancing and keeping at risk people safe.  Patient agreed to proceed with this visit due to these circumstances.     Liz Peter complains of   Chief Complaint   Patient presents with     Telephone     having symptoms, been with same partner. had a positive chlamydia 6 months ago      Patient is concerned about having chlamydia.  Her boyfriend notes that he \"may not have\" properly taken his medication when patient had infection 7 months ago.  She wants to make sure she does not have infection.  Also has not been screened for other sexually transmitted diseases.  We discussed what she should be tested for.    She is also now interested in HPV " vaccine.    Coming in for Depo tomorrow.    ALLERGIES  No known drug allergy                  Reviewed and updated as needed this visit by Provider  Tobacco  Allergies  Meds  Problems  Med Hx  Surg Hx  Fam Hx         Review of Systems   ROS COMP: Constitutional, HEENT, cardiovascular, pulmonary, gi and gu systems are negative, except as otherwise noted.       Objective   Reported vitals:  There were no vitals taken for this visit.   healthy, alert and no distress  Psych: Alert and oriented times 3; coherent speech, normal   rate and volume, able to articulate logical thoughts, able   to abstract reason, no tangential thoughts, no hallucinations   or delusions  Her affect is normal             Assessment/Plan:  ASSESSMENT/ORDERS:    ICD-10-CM    1. History of chlamydia  Z86.19 Hepatitis B core antibody IgM     Hepatitis B Surface Antibody     Hepatitis B surface antigen     Hepatitis C antibody     HIV Antigen Antibody Combo     NEISSERIA GONORRHOEA PCR     CHLAMYDIA TRACHOMATIS PCR     Treponema Abs w Reflex to RPR and Titer     PLAN:  1.  Sexually transmitted disease testing as noted above.  Coming in tomorrow for testing.  Will treat any positive tests as indicated.  2.  HPV vaccine tomorrow with depo shot.    Return in about 1 day (around 4/10/2020).      Phone call duration:  14 minutes    Arcadio Malone MD

## 2020-04-14 ENCOUNTER — ALLIED HEALTH/NURSE VISIT (OUTPATIENT)
Dept: FAMILY MEDICINE | Facility: CLINIC | Age: 20
End: 2020-04-14
Payer: COMMERCIAL

## 2020-04-14 VITALS
WEIGHT: 112.5 LBS | TEMPERATURE: 98 F | SYSTOLIC BLOOD PRESSURE: 110 MMHG | DIASTOLIC BLOOD PRESSURE: 68 MMHG | OXYGEN SATURATION: 100 % | RESPIRATION RATE: 18 BRPM | HEART RATE: 125 BPM | BODY MASS INDEX: 20.7 KG/M2 | HEIGHT: 62 IN

## 2020-04-14 DIAGNOSIS — Z23 ENCOUNTER FOR IMMUNIZATION: Primary | ICD-10-CM

## 2020-04-14 DIAGNOSIS — Z86.19 HISTORY OF CHLAMYDIA: ICD-10-CM

## 2020-04-14 PROCEDURE — 87491 CHLMYD TRACH DNA AMP PROBE: CPT | Performed by: FAMILY MEDICINE

## 2020-04-14 PROCEDURE — 96372 THER/PROPH/DIAG INJ SC/IM: CPT

## 2020-04-14 PROCEDURE — 90651 9VHPV VACCINE 2/3 DOSE IM: CPT

## 2020-04-14 PROCEDURE — 90471 IMMUNIZATION ADMIN: CPT

## 2020-04-14 PROCEDURE — 99207 ZZC NO CHARGE NURSE ONLY: CPT

## 2020-04-14 PROCEDURE — 87591 N.GONORRHOEAE DNA AMP PROB: CPT | Performed by: FAMILY MEDICINE

## 2020-04-14 RX ADMIN — MEDROXYPROGESTERONE ACETATE 150 MG: 150 INJECTION, SUSPENSION INTRAMUSCULAR at 13:30

## 2020-04-14 ASSESSMENT — PAIN SCALES - GENERAL: PAINLEVEL: NO PAIN (0)

## 2020-04-14 ASSESSMENT — MIFFLIN-ST. JEOR: SCORE: 1228.55

## 2020-04-15 LAB
C TRACH DNA SPEC QL NAA+PROBE: NEGATIVE
N GONORRHOEA DNA SPEC QL NAA+PROBE: NEGATIVE
SPECIMEN SOURCE: NORMAL
SPECIMEN SOURCE: NORMAL

## 2020-04-15 NOTE — RESULT ENCOUNTER NOTE
"Will have staff call patient regarding normal test results:  \"Your test results fall within the expected range(s) or remain unchanged from previous results.  Please continue with your current treatment plan.\"    Arcadio Malone MD"

## 2020-07-14 ENCOUNTER — ALLIED HEALTH/NURSE VISIT (OUTPATIENT)
Dept: FAMILY MEDICINE | Facility: CLINIC | Age: 20
End: 2020-07-14
Payer: COMMERCIAL

## 2020-07-14 VITALS — SYSTOLIC BLOOD PRESSURE: 90 MMHG | DIASTOLIC BLOOD PRESSURE: 52 MMHG | WEIGHT: 110.5 LBS | BODY MASS INDEX: 20.21 KG/M2

## 2020-07-14 DIAGNOSIS — Z30.42 ENCOUNTER FOR SURVEILLANCE OF INJECTABLE CONTRACEPTIVE: ICD-10-CM

## 2020-07-14 DIAGNOSIS — Z23 NEED FOR VACCINATION: Primary | ICD-10-CM

## 2020-07-14 PROCEDURE — 99207 ZZC NO CHARGE NURSE ONLY: CPT

## 2020-07-14 PROCEDURE — 90471 IMMUNIZATION ADMIN: CPT

## 2020-07-14 PROCEDURE — 90651 9VHPV VACCINE 2/3 DOSE IM: CPT

## 2020-07-14 RX ADMIN — MEDROXYPROGESTERONE ACETATE 150 MG: 150 INJECTION, SUSPENSION INTRAMUSCULAR at 14:08

## 2020-07-14 NOTE — PROGRESS NOTES
Prior to immunization administration, verified patients identity using patient s name and date of birth. Please see Immunization Activity for additional information.     Screening Questionnaire for Adult Immunization    Are you sick today?   No   Do you have allergies to medications, food, a vaccine component or latex?   No   Have you ever had a serious reaction after receiving a vaccination?   No   Do you have a long-term health problem with heart, lung, kidney, or metabolic disease (e.g., diabetes), asthma, a blood disorder, no spleen, complement component deficiency, a cochlear implant, or a spinal fluid leak?  Are you on long-term aspirin therapy?   No   Do you have cancer, leukemia, HIV/AIDS, or any other immune system problem?   No   Do you have a parent, brother, or sister with an immune system problem?   No   In the past 3 months, have you taken medications that affect  your immune system, such as prednisone, other steroids, or anticancer drugs; drugs for the treatment of rheumatoid arthritis, Crohn s disease, or psoriasis; or have you had radiation treatments?   No   Have you had a seizure, or a brain or other nervous system problem?   No   During the past year, have you received a transfusion of blood or blood    products, or been given immune (gamma) globulin or antiviral drug?   No   For women: Are you pregnant or is there a chance you could become       pregnant during the next month?   No   Have you received any vaccinations in the past 4 weeks?   No     Immunization questionnaire answers were all negative.        Per orders of Дмитрий Peters, injection of HPV  given by Sia Cruz CMA. Patient instructed to remain in clinic for 15 minutes afterwards, and to report any adverse reaction to me immediately.       Screening performed by Sia Cruz CMA on 7/14/2020 at 1:46 PM.    Clinic Administered Medication Documentation      Depo Provera Documentation    URINE HCG: not  indicated    Depo-Provera Standing Order inclusion/exclusion criteria reviewed.   Patient meets: inclusion criteria     BP: 90/52  LAST PAP/EXAM: No results found for: PAP    Prior to injection, verified patient identity using patient's name and date of birth. Medication was administered. Please see MAR and medication order for additional information.     Was entire vial of medication used? Yes  Vial/Syringe: Single dose vial  Expiration Date:  02/2022    Patient instructed to remain in clinic for 15 minutes and report any adverse reaction to staff immediately .  NEXT INJECTION DUE: 9/29/20 - 10/13/20    Given to patient in LD arm only.

## 2020-12-06 ENCOUNTER — HEALTH MAINTENANCE LETTER (OUTPATIENT)
Age: 20
End: 2020-12-06

## 2021-04-20 ENCOUNTER — OFFICE VISIT (OUTPATIENT)
Dept: FAMILY MEDICINE | Facility: CLINIC | Age: 21
End: 2021-04-20
Payer: COMMERCIAL

## 2021-04-20 VITALS
RESPIRATION RATE: 18 BRPM | OXYGEN SATURATION: 99 % | TEMPERATURE: 98.7 F | BODY MASS INDEX: 21.9 KG/M2 | SYSTOLIC BLOOD PRESSURE: 106 MMHG | HEART RATE: 94 BPM | DIASTOLIC BLOOD PRESSURE: 66 MMHG | HEIGHT: 62 IN | WEIGHT: 119 LBS

## 2021-04-20 DIAGNOSIS — Z12.4 CERVICAL CANCER SCREENING: ICD-10-CM

## 2021-04-20 DIAGNOSIS — Z11.3 SCREENING EXAMINATION FOR VENEREAL DISEASE: ICD-10-CM

## 2021-04-20 DIAGNOSIS — Z00.00 ROUTINE GENERAL MEDICAL EXAMINATION AT A HEALTH CARE FACILITY: Primary | ICD-10-CM

## 2021-04-20 DIAGNOSIS — Z30.013 ENCOUNTER FOR INITIAL PRESCRIPTION OF INJECTABLE CONTRACEPTIVE: ICD-10-CM

## 2021-04-20 LAB — HCG UR QL: NEGATIVE

## 2021-04-20 PROCEDURE — 96372 THER/PROPH/DIAG INJ SC/IM: CPT | Performed by: FAMILY MEDICINE

## 2021-04-20 PROCEDURE — 87491 CHLMYD TRACH DNA AMP PROBE: CPT | Performed by: FAMILY MEDICINE

## 2021-04-20 PROCEDURE — G0145 SCR C/V CYTO,THINLAYER,RESCR: HCPCS | Performed by: FAMILY MEDICINE

## 2021-04-20 PROCEDURE — 81025 URINE PREGNANCY TEST: CPT | Performed by: FAMILY MEDICINE

## 2021-04-20 PROCEDURE — 87591 N.GONORRHOEAE DNA AMP PROB: CPT | Performed by: FAMILY MEDICINE

## 2021-04-20 PROCEDURE — 99395 PREV VISIT EST AGE 18-39: CPT | Mod: 25 | Performed by: FAMILY MEDICINE

## 2021-04-20 RX ORDER — MEDROXYPROGESTERONE ACETATE 150 MG/ML
150 INJECTION, SUSPENSION INTRAMUSCULAR
Status: ACTIVE | OUTPATIENT
Start: 2021-04-20 | End: 2022-04-15

## 2021-04-20 RX ADMIN — MEDROXYPROGESTERONE ACETATE 150 MG: 150 INJECTION, SUSPENSION INTRAMUSCULAR at 16:50

## 2021-04-20 ASSESSMENT — MIFFLIN-ST. JEOR: SCORE: 1253.03

## 2021-04-20 ASSESSMENT — PAIN SCALES - GENERAL: PAINLEVEL: NO PAIN (0)

## 2021-04-20 NOTE — PATIENT INSTRUCTIONS
Preventive Health Recommendations  Female Ages 21 to 25     Yearly exam:     See your health care provider every year in order to  o Review health changes.   o Discuss preventive care.    o Review your medicines if your doctor has prescribed any.      You should be tested each year for STDs (sexually transmitted diseases).       Talk to your provider about how often you should have cholesterol testing.      Get a Pap test every three years. If you have an abnormal result, your doctor may have you test more often.      If you are at risk for diabetes, you should have a diabetes test (fasting glucose).     Shots:     Get a flu shot each year.     Get a tetanus shot every 10 years.     Consider getting the shot (vaccine) that prevents cervical cancer (Gardasil).    Nutrition:     Eat at least 5 servings of fruits and vegetables each day.    Eat whole-grain bread, whole-wheat pasta and brown rice instead of white grains and rice.    Get adequate Calcium and Vitamin D.     Lifestyle    Exercise at least 150 minutes a week each week (30 minutes a day, 5 days a week). This will help you control your weight and prevent disease.    Limit alcohol to one drink per day.    No smoking.     Wear sunscreen to prevent skin cancer.    See your dentist every six months for an exam and cleaning.    COVID-19 VACCINE INFORMATION  [facility] vaccine information:  Visit [email or web address] for current information on COVID-19 including vaccine related information.  Can also call [phone] for appointment information.    [facility] vaccine information:  [place]://[place]covid[place]vaccine  https://[place].gov/covid19/vaccine/connector/index.jsp    Frequently asked questions about COVID-19 and the vaccine:    Center for Disease Control and Infection (CDC):  www.cdc.gov/vaccines/covid-19/index.html    American Academy of Family Physicians (AAFP):  https://familydoctor.org/covid-19-vaccine

## 2021-04-20 NOTE — PROGRESS NOTES
SUBJECTIVE:   CC: Liz Peter is an 21 year old woman who presents for preventive health visit.       Patient has been advised of split billing requirements and indicates understanding: Yes  Healthy Habits:     Getting at least 3 servings of Calcium per day:  Yes    Bi-annual eye exam:  NO    Dental care twice a year:  Yes    Sleep apnea or symptoms of sleep apnea:  None    Diet:  Regular (no restrictions)    Duration of exercise:  N/A    Taking medications regularly:  No    Barriers to taking medications:  None    Medication side effects:  None    PHQ-2 Total Score: 0    Additional concerns today:  No    Interested in depo provera injection.  Is late for dosing.  Needs urine pregnancy test today prior to injection.        Today's PHQ-2 Score:   PHQ-2 ( 1999 Pfizer) 4/20/2021   Q1: Little interest or pleasure in doing things 0   Q2: Feeling down, depressed or hopeless 0   PHQ-2 Score 0   Q1: Little interest or pleasure in doing things -   Q2: Feeling down, depressed or hopeless -   PHQ-2 Score -       Abuse: Current or Past (Physical, Sexual or Emotional) - No  Do you feel safe in your environment? Yes    Have you ever done Advance Care Planning? (For example, a Health Directive, POLST, or a discussion with a medical provider or your loved ones about your wishes): No, advance care planning information given to patient to review.  Advanced care planning was discussed at today's visit.    Social History     Tobacco Use     Smoking status: Never Smoker     Smokeless tobacco: Never Used   Substance Use Topics     Alcohol use: No     Alcohol/week: 0.0 standard drinks         Alcohol Use 9/6/2019   Prescreen: >3 drinks/day or >7 drinks/week? No       Reviewed orders with patient.  Reviewed health maintenance and updated orders accordingly - Yes      Breast Cancer Screening:        History of abnormal Pap smear: NO - age 21-29 PAP every 3 years recommended  PAP / HPV 4/20/2021   PAP NIL     Reviewed and updated as  "needed this visit by clinical staff  Tobacco  Allergies  Meds  Problems  Med Hx  Surg Hx  Fam Hx          Reviewed and updated as needed this visit by Provider  Tobacco  Allergies  Meds  Problems  Med Hx  Surg Hx  Fam Hx             Review of Systems  CONSTITUTIONAL: NEGATIVE for fever, chills, change in weight  INTEGUMENTARU/SKIN: NEGATIVE for worrisome rashes, moles or lesions  EYES: NEGATIVE for vision changes or irritation  ENT: NEGATIVE for ear, mouth and throat problems  RESP: NEGATIVE for significant cough or SOB  BREAST: NEGATIVE for masses, tenderness or discharge  CV: NEGATIVE for chest pain, palpitations or peripheral edema  GI: NEGATIVE for nausea, abdominal pain, heartburn, or change in bowel habits  : NEGATIVE for unusual urinary or vaginal symptoms. Periods are regular.  MUSCULOSKELETAL: NEGATIVE for significant arthralgias or myalgia  NEURO: NEGATIVE for weakness, dizziness or paresthesias  PSYCHIATRIC: NEGATIVE for changes in mood or affect     OBJECTIVE:   /66   Pulse 94   Temp 98.7  F (37.1  C) (Temporal)   Resp 18   Ht 1.575 m (5' 2\")   Wt 54 kg (119 lb)   LMP 04/12/2021   SpO2 99%   Breastfeeding No   BMI 21.77 kg/m    Physical Exam  Exam conducted with a chaperone present.   Constitutional:       General: She is not in acute distress.     Appearance: Normal appearance. She is well-developed.   HENT:      Right Ear: Hearing, tympanic membrane, ear canal and external ear normal.      Left Ear: Hearing, tympanic membrane, ear canal and external ear normal.      Nose: Nose normal.      Mouth/Throat:      Pharynx: Uvula midline. No oropharyngeal exudate or posterior oropharyngeal erythema.   Eyes:      General: Lids are normal.         Right eye: No discharge.         Left eye: No discharge.      Conjunctiva/sclera: Conjunctivae normal.      Pupils: Pupils are equal, round, and reactive to light.   Neck:      Musculoskeletal: Normal range of motion and neck supple.     "  Thyroid: No thyromegaly.      Trachea: No tracheal deviation.   Cardiovascular:      Rate and Rhythm: Normal rate and regular rhythm.      Pulses: Normal pulses.      Heart sounds: Normal heart sounds, S1 normal and S2 normal. No murmur. No friction rub. No S3 or S4 sounds.    Pulmonary:      Effort: Pulmonary effort is normal. No respiratory distress.      Breath sounds: Normal breath sounds. No wheezing or rales.   Abdominal:      General: Bowel sounds are normal.      Palpations: Abdomen is soft. There is no mass.      Tenderness: There is no abdominal tenderness.   Genitourinary:     Labia:         Right: No rash, tenderness, lesion or injury.         Left: No rash, tenderness, lesion or injury.       Vagina: No vaginal discharge or bleeding.      Cervix: No discharge or friability.      Comments: Pap obtained  Musculoskeletal: Normal range of motion.   Lymphadenopathy:      Cervical: No cervical adenopathy.      Upper Body:      Right upper body: No supraclavicular adenopathy.      Left upper body: No supraclavicular adenopathy.   Skin:     General: Skin is warm and dry.      Findings: No rash.   Neurological:      Mental Status: She is alert and oriented to person, place, and time.      Cranial Nerves: No cranial nerve deficit.      Sensory: No sensory deficit.      Motor: No abnormal muscle tone.      Deep Tendon Reflexes: Reflexes are normal and symmetric.   Psychiatric:         Thought Content: Thought content normal.         Judgment: Judgment normal.               ASSESSMENT/PLAN:       ICD-10-CM    1. Routine general medical examination at a health care facility  Z00.00    2. Encounter for initial prescription of injectable contraceptive  Z30.013 HCG Qual, Urine ([id number])     medroxyPROGESTERone (DEPO-PROVERA) injection 150 mg   3. Cervical cancer screening  Z12.4 Pap imaged thin layer screen only - recommended age 21 - 24 years   4. Screening examination for venereal disease  Z11.3 NEISSERIA GONORRHOEA  "PCR     CHLAMYDIA TRACHOMATIS PCR       Patient has been advised of split billing requirements and indicates understanding: Yes  COUNSELING:  Reviewed preventive health counseling, as reflected in patient instructions    Estimated body mass index is 21.77 kg/m  as calculated from the following:    Height as of this encounter: 1.575 m (5' 2\").    Weight as of this encounter: 54 kg (119 lb).        She reports that she has never smoked. She has never used smokeless tobacco.      Counseling Resources:  ATP IV Guidelines  Pooled Cohorts Equation Calculator  Breast Cancer Risk Calculator  BRCA-Related Cancer Risk Assessment: FHS-7 Tool  FRAX Risk Assessment  ICSI Preventive Guidelines  Dietary Guidelines for Americans, 2010  USDA's MyPlate  ASA Prophylaxis  Lung CA Screening    Arcadio Malone MD  [facility]  "

## 2021-04-22 LAB
COPATH REPORT: NORMAL
PAP: NORMAL

## 2021-04-25 NOTE — RESULT ENCOUNTER NOTE
Liz,  Your results are normal.  You will be notified of your pap results separately at a later time.  Please let me know if you have any questions.    Sincerely,  Dr. Malone

## 2021-07-20 ENCOUNTER — TRANSFERRED RECORDS (OUTPATIENT)
Dept: HEALTH INFORMATION MANAGEMENT | Facility: CLINIC | Age: 21
End: 2021-07-20

## 2021-07-23 ENCOUNTER — ALLIED HEALTH/NURSE VISIT (OUTPATIENT)
Dept: FAMILY MEDICINE | Facility: CLINIC | Age: 21
End: 2021-07-23
Payer: COMMERCIAL

## 2021-07-23 ENCOUNTER — TELEPHONE (OUTPATIENT)
Dept: FAMILY MEDICINE | Facility: CLINIC | Age: 21
End: 2021-07-23

## 2021-07-23 ENCOUNTER — LAB (OUTPATIENT)
Dept: LAB | Facility: CLINIC | Age: 21
End: 2021-07-23
Payer: COMMERCIAL

## 2021-07-23 ENCOUNTER — HOSPITAL ENCOUNTER (OUTPATIENT)
Dept: GENERAL RADIOLOGY | Facility: CLINIC | Age: 21
Discharge: HOME | End: 2021-07-23
Attending: FAMILY MEDICINE | Admitting: FAMILY MEDICINE
Payer: COMMERCIAL

## 2021-07-23 VITALS — DIASTOLIC BLOOD PRESSURE: 60 MMHG | SYSTOLIC BLOOD PRESSURE: 110 MMHG | BODY MASS INDEX: 20.63 KG/M2 | WEIGHT: 112.8 LBS

## 2021-07-23 DIAGNOSIS — Z30.013 ENCOUNTER FOR INITIAL PRESCRIPTION OF INJECTABLE CONTRACEPTIVE: ICD-10-CM

## 2021-07-23 DIAGNOSIS — R76.11 POSITIVE SKIN TEST FOR TUBERCULOSIS: ICD-10-CM

## 2021-07-23 DIAGNOSIS — Z30.013 ENCOUNTER FOR INITIAL PRESCRIPTION OF INJECTABLE CONTRACEPTIVE: Primary | ICD-10-CM

## 2021-07-23 DIAGNOSIS — R76.11 POSITIVE SKIN TEST FOR TUBERCULOSIS: Primary | ICD-10-CM

## 2021-07-23 LAB — HCG UR QL: NEGATIVE

## 2021-07-23 PROCEDURE — 99207 PR NO CHARGE NURSE ONLY: CPT

## 2021-07-23 PROCEDURE — 71046 X-RAY EXAM CHEST 2 VIEWS: CPT

## 2021-07-23 PROCEDURE — 96372 THER/PROPH/DIAG INJ SC/IM: CPT | Performed by: FAMILY MEDICINE

## 2021-07-23 PROCEDURE — 81025 URINE PREGNANCY TEST: CPT

## 2021-07-23 RX ADMIN — MEDROXYPROGESTERONE ACETATE 150 MG: 150 INJECTION, SUSPENSION INTRAMUSCULAR at 14:48

## 2021-07-23 NOTE — TELEPHONE ENCOUNTER
Patient wants an order for an xray since she got a positive test for TB at work.  Results form her work are in your mailbox.

## 2021-07-23 NOTE — TELEPHONE ENCOUNTER
Spoke to patient. Scheduled chest xray and follow up xray visit with Dr. Malone.    Mary Ann Vega on 7/23/2021 at 1:39 PM

## 2021-07-23 NOTE — PROGRESS NOTES
Clinic Administered Medication Documentation    Administrations This Visit     medroxyPROGESTERone (DEPO-PROVERA) injection 150 mg     Admin Date  07/23/2021 Action  Given Dose  150 mg Route  Intramuscular Site  Right Ventrogluteal Administered By  Camila Bundy CMA    Ordering Provider: Arcadio Malone MD    Patient Supplied?: No                  Depo Provera Documentation    URINE HCG: negative    Depo-Provera Standing Order inclusion/exclusion criteria reviewed.   Patient meets: inclusion criteria     BP: 110/60  LAST PAP/EXAM:   Lab Results   Component Value Date    PAP NIL 04/20/2021       Prior to injection, verified patient identity using patient's name and date of birth. Medication was administered. Please see MAR and medication order for additional information.     Was entire vial of medication used? Yes  Vial/Syringe: Single dose vial  Expiration Date:  12/31/2022    Patient instructed to remain in clinic for 15 minutes.  NEXT INJECTION DUE: 10/8/21 - 10/22/21

## 2021-07-23 NOTE — TELEPHONE ENCOUNTER
Patient needs a chest x-ray and a telephone, video, or office-based visit to review the results.  I will place order for chest x-ray.  Will have staff notify patient and help set up follow-up appointment.    Arcadio Malone MD

## 2021-07-26 ENCOUNTER — VIRTUAL VISIT (OUTPATIENT)
Dept: FAMILY MEDICINE | Facility: CLINIC | Age: 21
End: 2021-07-26
Payer: COMMERCIAL

## 2021-07-26 DIAGNOSIS — Z22.7 LATENT TUBERCULOSIS BY BLOOD TEST: Primary | ICD-10-CM

## 2021-07-26 PROCEDURE — 99213 OFFICE O/P EST LOW 20 MIN: CPT | Mod: 95 | Performed by: FAMILY MEDICINE

## 2021-07-26 NOTE — PROGRESS NOTES
Liz is a 21 year old who is being evaluated via a billable telephone visit.      What phone number would you like to be contacted at? [phone]  How would you like to obtain your AVS? MyChart    Assessment & Plan     ASSESSMENT/ORDERS:    ICD-10-CM    1. Latent tuberculosis by blood test  Z22.7 Infectious Disease Referral     PLAN:  1.  Discussed that her TB blood test is likely 98% specific and she should be considered for treatment for latent TB.  Referral to infectious disease to discuss treatment options and long term monitoring from this.                  No follow-ups on file.    Arcadio Malone MD  [facility]   Liz is a 21 year old who presents for the following health issues     HPI     Chief Complaint   Patient presents with     Results     x-ray results from 7/23/2021       Positive interferon gold TB test.  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  Last TB test was a mantoux 18 months ago.  She is not aware of any contact she would have had with TB as she has not had much for clinical rotations due to COVID-19 pandemic.      No history of cough, weight loss, night sweats, fevers, chills.  No bloody sputum.    Review of Systems         Objective           Vitals:  No vitals were obtained today due to virtual visit.    Physical Exam   healthy, alert and no distress  PSYCH: Alert and oriented times 3; coherent speech, normal   rate and volume, able to articulate logical thoughts, able   to abstract reason, no tangential thoughts, no hallucinations   or delusions  Her affect is normal  RESP: No cough, no audible wheezing, able to talk in full sentences  Remainder of exam unable to be completed due to telephone visits    CXR - Reviewed and interpreted by radiology:     HISTORY: Positive skin test for tuberculosis     COMPARISON: Chest x-ray 9/3/2012.                                                                    IMPRESSION: PA and  lateral views of the chest. Lungs are clear. Heart  is normal in size. No effusions are evident. No pneumothorax.            Phone call duration: 25 minutes

## 2021-07-27 NOTE — RESULT ENCOUNTER NOTE
Liz,  Your results are normal.  Please let me know if you have any questions.    Sincerely,  Dr. Malone

## 2021-08-03 NOTE — TELEPHONE ENCOUNTER
RECORDS RECEIVED FROM: Internal   DATE RECEIVED: 08.31.2021   NOTES (Gather within 2 years) STATUS DETAILS   OFFICE NOTE from referring provider   Internal 07.26.2021 Arcadio Malone MD   OFFICE NOTE from other specialist N/A    DISCHARGE SUMMARY from hospital N/A    DISCHARGE REPORT from the ER N/A    LABS (any labs) Internal    MEDICATION LIST Internal    IMAGING  (NEED IMAGES AND REPORTS)     Osteomyelitis: Foot imaging  N/A    Liver Abscess: Abdominal imaging N/A    Other (anything related to diagnoses Internal 07.23.2021 XR CHEST 2 VW

## 2021-08-31 ENCOUNTER — PRE VISIT (OUTPATIENT)
Dept: INFECTIOUS DISEASES | Facility: CLINIC | Age: 21
End: 2021-08-31

## 2021-08-31 ENCOUNTER — VIRTUAL VISIT (OUTPATIENT)
Dept: INFECTIOUS DISEASES | Facility: CLINIC | Age: 21
End: 2021-08-31
Attending: FAMILY MEDICINE
Payer: COMMERCIAL

## 2021-08-31 DIAGNOSIS — R76.12 POSITIVE QUANTIFERON-TB GOLD TEST: Primary | ICD-10-CM

## 2021-08-31 DIAGNOSIS — Z11.3 ROUTINE SCREENING FOR STI (SEXUALLY TRANSMITTED INFECTION): ICD-10-CM

## 2021-08-31 PROCEDURE — 99204 OFFICE O/P NEW MOD 45 MIN: CPT | Mod: 95 | Performed by: INTERNAL MEDICINE

## 2021-08-31 ASSESSMENT — PAIN SCALES - GENERAL: PAINLEVEL: NO PAIN (0)

## 2021-08-31 NOTE — PROGRESS NOTES
Liz Peter is a 21 year old woman who is being evaluated via a billable video visit.      How would you like to obtain your AVS? MyChart  If the video visit is dropped, the invitation should be resent by: Text to cell phone: [phone]  Will anyone else be joining your video visit? 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   She did the Quantiferon blood test, and it returned positive.   Liz has done TB skin tests before (including a two step test the last time), and they were negative.  She never did a TB blood test before.    Liz does not have any medical history.  Her only medication is the Depo Provera injection for contraception.  No family history of personal history of any auto-immune or auto-inflammatory diseases.    Liz did not have any interval traveling, high risk TB contacts, or any other identified TB risk factors since her last negative TB skin test.   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      I have reviewed and updated the patient's Past Medical History, Social History, Family History and Medication List.    OBJECTIVE:    No current outpatient medications on file.     Current Facility-Administered Medications   Medication     medroxyPROGESTERone (DEPO-PROVERA) injection 150 mg     medroxyPROGESTERone (DEPO-PROVERA) injection 150 mg       No Known Allergies    Exam via video visit:     GENERAL: Patient appears well and is not in any acute distress    HEENT: The eyes do not appear to have any icterus or injection.  EOM appear intact.  RESPIRATORY:  No cough or labored breathing is noted.  SKIN:  No  rashes are visible  NEURO:  Awake, alert, no focal neurologic deficits are noted.  PSYCH: Affect is bright. Speech fluent and appropriate.      The rest of a comprehensive physical examination is deferred due to the public health emergency video visit restrictions.    Labs    7/26/2021 - scanned into Epic record  Result of Quantiferon Gold  ([facility] employee health)  Positive    Imaging  XR CHEST 2 VW   7/23/2021 2:58 PM      COMPARISON: Chest x-ray 9/3/2012.                                                                   IMPRESSION: PA and lateral views of the chest. Lungs are clear. Heart  is normal in size. No effusions are evident. No pneumothorax.      ASSESSMENT and PLAN:    Positive Quantiferon Gold test    Liz does not have any identified risk factors for acquiring TB.    She had previous TB skin tests before (including a two step test the last time), and they were negative.  This is her first positive IGRA.    The Quantiferon Gold test can be false positive.  I think this is possible for Liz's case.  She is not immunocompromised and is otherwise a healthy young woman.  It is reasonable to repeat the test before considering treatment for latent tuberculosis.    We will repeat the blood test.  I will also check a CMP in the event that the repeat test is positive, we will have baseline transaminases and bilirubin before starting INH or rifampin.   I also recommended an HIV test.  It looked like Liz was ordered for one as routine STI screening, but actually has never had one.   Every sexually active individual should be screened for HIV infection.    Orders Placed This Encounter   Procedures     Comprehensive metabolic panel     HIV Antigen Antibody Combo    Quantiferon TB Gold Plus       RTC on 9/28/2021 by video visit to discuss results.  4:30pm appt.        Video-Visit Details    Type of service:  Video Visit    Video Start Time: 3:06 PM  Video End Time:  3:26 PM    20 minutes    Originating  Location (pt. Location): Home    Distant Location (provider location):  [facility] [place]     Platform used for Video Visit: Flip Perez MD, MS  Infectious Disease    Time:  A total of 30 minutes was spent in care of the patient today. 20 minutes were spent on the video, with an additional 10 minutes spent on chart review, orders, and care coordination.      MDM billing  Level 4:  One new problem, uncertain prognosis  Reviewed 2 notes from PCP  Reviewed results of 1 lab test and 1 chest XR with independent interpretation of both  Ordered 3 new tests

## 2021-08-31 NOTE — LETTER
8/31/2021       RE: Liz Peter  [address]  [place] [address]     Dear Colleague,    Thank you for referring your patient, Liz Peter, to the [facility] [place] [facility]. Please see a copy of my visit note below.      Liz Peter is a 21 year old woman who is being evaluated via a billable video visit.      How would you like to obtain your AVS? MyChart  If the video visit is dropped, the invitation should be resent by: Text to cell phone: [phone]  Will anyone else be joining your video visit? 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   She did the Quantiferon blood test, and it returned positive.   Liz has done TB skin tests before (including a two step test the last time), and they were negative.  She never did a TB blood test before.    Liz does not have any medical history.  Her only medication is the Depo Provera injection for contraception.  No family history of personal history of any auto-immune or auto-inflammatory diseases.    Liz did not have any interval traveling, high risk TB contacts, or any other identified TB risk factors since her last negative TB skin test.   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      I have reviewed and updated the patient's Past Medical History, Social History, Family History and Medication List.    OBJECTIVE:    No current outpatient medications on file.     Current Facility-Administered Medications   Medication     medroxyPROGESTERone (DEPO-PROVERA) injection 150 mg      medroxyPROGESTERone (DEPO-PROVERA) injection 150 mg       No Known Allergies    Exam via video visit:     GENERAL: Patient appears well and is not in any acute distress    HEENT: The eyes do not appear to have any icterus or injection.  EOM appear intact.  RESPIRATORY:  No cough or labored breathing is noted.  SKIN:  No rashes are visible  NEURO:  Awake, alert, no focal neurologic deficits are noted.  PSYCH: Affect is bright. Speech fluent and appropriate.      The rest of a comprehensive physical examination is deferred due to the public health emergency video visit restrictions.    Labs    7/26/2021 - scanned into Epic record  Result of Quantiferon Gold  ([facility])  Positive    Imaging  XR CHEST 2 VW   7/23/2021 2:58 PM      COMPARISON: Chest x-ray 9/3/2012.                                                                   IMPRESSION: PA and lateral views of the chest. Lungs are clear. Heart  is normal in size. No effusions are evident. No pneumothorax.      ASSESSMENT and PLAN:    Positive Quantiferon Gold test    Liz does not have any identified risk factors for acquiring TB.    She had previous TB skin tests before (including a two step test the last time), and they were negative.  This is her first positive IGRA.    The Quantiferon Gold test can be false positive.  I think this is possible for Liz's case.  She is not immunocompromised and is otherwise a healthy young woman.  It is reasonable to repeat the test before considering treatment for latent tuberculosis.    We will repeat the blood test.  I will also check a CMP in the event that the repeat test is positive, we will have baseline transaminases and bilirubin before starting INH or rifampin.   I also recommended an HIV test.  It looked like Liz was ordered for one as routine STI screening, but actually has never had one.   Every sexually active individual should be screened for HIV infection.    Orders Placed This Encounter    Procedures     Comprehensive metabolic panel     HIV Antigen Antibody Combo    Quantiferon TB Gold Plus       RTC on 9/28/2021 by video visit to discuss results.  4:30pm appt.        Video-Visit Details    Type of service:  Video Visit    Video Start Time: 3:06 PM  Video End Time:  3:26 PM    20 minutes    Originating Location (pt. Location): Home    Distant Location (provider location):  [facility]FECTI[facility] CLINIC [place]     Platform used for Video Visit: Flip Perez MD, MS  Infectious Disease    Time:  A total of 30 minutes was spent in care of the patient today. 20 minutes were spent on the video, with an additional 10 minutes spent on chart review, orders, and care coordination.      MDM billing  Level 4:  One new problem, uncertain prognosis  Reviewed 2 notes from PCP  Reviewed results of 1 lab test and 1 chest XR with independent interpretation of both  Ordered 3 new tests

## 2021-09-09 ENCOUNTER — MYC MEDICAL ADVICE (OUTPATIENT)
Dept: FAMILY MEDICINE | Facility: CLINIC | Age: 21
End: 2021-09-09

## 2021-09-09 NOTE — TELEPHONE ENCOUNTER
No testing for this.  She would need to see if she has an infection, and then proper evaluation from there.  Appointment only needed if infection symptoms present or if she would like to discuss this matter in more detail.  Will have staff notify patient.    Arcadio Malone MD

## 2021-09-09 NOTE — TELEPHONE ENCOUNTER
I have replied to the pt's [facility] message with the message below. Camila Bundy, CMA

## 2021-09-10 ENCOUNTER — LAB (OUTPATIENT)
Dept: LAB | Facility: CLINIC | Age: 21
End: 2021-09-10
Payer: COMMERCIAL

## 2021-09-10 ENCOUNTER — TELEPHONE (OUTPATIENT)
Dept: FAMILY MEDICINE | Facility: CLINIC | Age: 21
End: 2021-09-10

## 2021-09-10 DIAGNOSIS — R76.12 POSITIVE QUANTIFERON-TB GOLD TEST: ICD-10-CM

## 2021-09-10 DIAGNOSIS — Z11.3 ROUTINE SCREENING FOR STI (SEXUALLY TRANSMITTED INFECTION): ICD-10-CM

## 2021-09-10 LAB
ALBUMIN SERPL-MCNC: 4.2 G/DL (ref 3.4–5)
ALP SERPL-CCNC: 58 U/L (ref 40–150)
ALT SERPL W P-5'-P-CCNC: 16 U/L (ref 0–50)
ANION GAP SERPL CALCULATED.3IONS-SCNC: 6 MMOL/L (ref 3–14)
AST SERPL W P-5'-P-CCNC: 12 U/L (ref 0–45)
BILIRUB SERPL-MCNC: 0.3 MG/DL (ref 0.2–1.3)
BUN SERPL-MCNC: 11 MG/DL (ref 7–30)
CALCIUM SERPL-MCNC: 8.9 MG/DL (ref 8.5–10.1)
CHLORIDE BLD-SCNC: 109 MMOL/L (ref 94–109)
CO2 SERPL-SCNC: 25 MMOL/L (ref 20–32)
CREAT SERPL-MCNC: 0.77 MG/DL (ref 0.52–1.04)
GFR SERPL CREATININE-BSD FRML MDRD: >90 ML/MIN/1.73M2
GLUCOSE BLD-MCNC: 80 MG/DL (ref 70–99)
POTASSIUM BLD-SCNC: 3.6 MMOL/L (ref 3.4–5.3)
PROT SERPL-MCNC: 7.5 G/DL (ref 6.8–8.8)
SODIUM SERPL-SCNC: 140 MMOL/L (ref 133–144)

## 2021-09-10 PROCEDURE — 36415 COLL VENOUS BLD VENIPUNCTURE: CPT

## 2021-09-10 PROCEDURE — 87389 HIV-1 AG W/HIV-1&-2 AB AG IA: CPT

## 2021-09-10 PROCEDURE — 80053 COMPREHEN METABOLIC PANEL: CPT

## 2021-09-10 NOTE — TELEPHONE ENCOUNTER
Patient was in for lab appt today and had RR called due to syncope. When I arrived she was pale and leaning over in chair but awake and oriented. Diaphoretic. BP 99/60, pulse 59, o2 sat 98%.     She was given juice and water, but as she was sitting up to have BP checked she began to have tonic clonic movements and passed out again, was unresponsive for 5-10 seconds. She was helped into wheelchair and brought to exam room to rest, eat and drink and felt better. She states this happens to her usually with blood draws. PCP notified as well.     Makayla Pierson MD

## 2021-09-13 ENCOUNTER — MYC MEDICAL ADVICE (OUTPATIENT)
Dept: INFECTIOUS DISEASES | Facility: CLINIC | Age: 21
End: 2021-09-13

## 2021-09-13 DIAGNOSIS — R76.12 POSITIVE QUANTIFERON-TB GOLD TEST: Primary | ICD-10-CM

## 2021-09-13 LAB — HIV 1+2 AB+HIV1 P24 AG SERPL QL IA: NONREACTIVE

## 2021-09-25 ENCOUNTER — HEALTH MAINTENANCE LETTER (OUTPATIENT)
Age: 21
End: 2021-09-25

## 2021-10-14 ENCOUNTER — MYC MEDICAL ADVICE (OUTPATIENT)
Dept: FAMILY MEDICINE | Facility: CLINIC | Age: 21
End: 2021-10-14

## 2021-10-14 NOTE — TELEPHONE ENCOUNTER
Patient is informed she will need to schedule a virtual visit to discuss options.  Closing this encounter.  LENNIE ScottN, RN

## 2021-10-25 ENCOUNTER — VIRTUAL VISIT (OUTPATIENT)
Dept: FAMILY MEDICINE | Facility: CLINIC | Age: 21
End: 2021-10-25
Payer: COMMERCIAL

## 2021-10-25 DIAGNOSIS — Z30.014 ENCOUNTER FOR INITIAL PRESCRIPTION OF INTRAUTERINE CONTRACEPTIVE DEVICE (IUD): Primary | ICD-10-CM

## 2021-10-25 PROCEDURE — 99213 OFFICE O/P EST LOW 20 MIN: CPT | Mod: GT | Performed by: FAMILY MEDICINE

## 2021-10-25 RX ORDER — MISOPROSTOL 200 UG/1
TABLET ORAL
Qty: 4 TABLET | Refills: 0 | Status: SHIPPED | OUTPATIENT
Start: 2021-10-25 | End: 2021-10-02

## 2021-10-25 NOTE — PROGRESS NOTES
Liz is a 21 year old who is being evaluated via a billable video visit.      How would you like to obtain your AVS? MyChart  If the video visit is dropped, the invitation should be resent by: Text to cell phone: [phone]  Will anyone else be joining your video visit? No      Video Start Time: 3:03 PM    Assessment & Plan     ASSESSMENT/ORDERS:    ICD-10-CM    1. Encounter for initial prescription of intrauterine contraceptive device (IUD)  Z30.014 misoprostol (CYTOTEC) 200 MCG tablet     PLAN:  1.  Discussed pros/cons about ParaGard IUD along with risks/benefits of device.  She is going to set up placement appointment.  Since she is nulliparous, I gave her Cytotec to take prior to the procedure to help increase success of placement.                  No follow-ups on file.    Arcadio Malone MD  [facility]   Liz is a 21 year old who presents for the following health issues     HPI     Concern - discuss birth control  Onset:   Description: discuss birth control  Intensity: 0/10  Progression of Symptoms:    Accompanying Signs & Symptoms: mood changes  Previous history of similar problem:   Precipitating factors:        Worsened by:   Alleviating factors:        Improved by:   Therapies tried and outcome: depo provera      Patient interested in copper IUD due to no hormones.  She has potential latent TB diagnosis and may need treatment and was recommended to change to nonhormonal birth control option . She also is no longer interested in depo provera due to her mood swings and is interested in nonhormonal option.    Is in monogamous relationship.  No history of sexually transmitted diseases.    Review of Systems         Objective           Vitals:  No vitals were obtained today due to virtual visit.    Physical Exam   GENERAL: Healthy, alert and no distress  EYES: Eyes grossly normal to inspection.  No discharge or erythema, or obvious scleral/conjunctival  abnormalities.  RESP: No audible wheeze, cough, or visible cyanosis.  No visible retractions or increased work of breathing.    SKIN: Visible skin clear. No significant rash, abnormal pigmentation or lesions.  NEURO: Cranial nerves grossly intact.  Mentation and speech appropriate for age.  PSYCH: Mentation appears normal, affect normal/bright, judgement and insight intact, normal speech and appearance well-groomed.                Video-Visit Details    Type of service:  Video Visit    Video End Time:3:23 PM    Originating Location (pt. Location): Other work (in [place])    Distant Location (provider location):  [facility]     Platform used for Video Visit: [employer]

## 2021-10-25 NOTE — NURSING NOTE
Health Maintenance Due   Topic Date Due     ANNUAL REVIEW OF HM ORDERS  Never done     COVID-19 Vaccine (1) Never done     HPV IMMUNIZATION (3 - 3-dose series) 11/14/2020     INFLUENZA VACCINE (1) 09/01/2021     Licha GARDUNO LPN

## 2021-10-28 ENCOUNTER — TELEPHONE (OUTPATIENT)
Dept: FAMILY MEDICINE | Facility: CLINIC | Age: 21
End: 2021-10-28

## 2021-10-28 NOTE — TELEPHONE ENCOUNTER
Patient calling and requesting an appointment for IUD placement, note patient had a virtual to discuss with provider on 10/25. Procedure has been scheduled on Friday 11/19 for 1 hour. Mariaelena Davis LPN

## 2021-11-19 ENCOUNTER — OFFICE VISIT (OUTPATIENT)
Dept: FAMILY MEDICINE | Facility: CLINIC | Age: 21
End: 2021-11-19
Payer: COMMERCIAL

## 2021-11-19 VITALS
RESPIRATION RATE: 18 BRPM | OXYGEN SATURATION: 98 % | TEMPERATURE: 98.1 F | HEART RATE: 120 BPM | WEIGHT: 116.13 LBS | SYSTOLIC BLOOD PRESSURE: 118 MMHG | BODY MASS INDEX: 21.24 KG/M2 | DIASTOLIC BLOOD PRESSURE: 70 MMHG

## 2021-11-19 DIAGNOSIS — Z30.9 CONTRACEPTIVE MANAGEMENT: ICD-10-CM

## 2021-11-19 DIAGNOSIS — Z30.42 ENCOUNTER FOR SURVEILLANCE OF INJECTABLE CONTRACEPTIVE: Primary | ICD-10-CM

## 2021-11-19 LAB — HCG UR QL: NEGATIVE

## 2021-11-19 PROCEDURE — 96372 THER/PROPH/DIAG INJ SC/IM: CPT | Performed by: FAMILY MEDICINE

## 2021-11-19 PROCEDURE — 99212 OFFICE O/P EST SF 10 MIN: CPT | Mod: 25 | Performed by: FAMILY MEDICINE

## 2021-11-19 PROCEDURE — 81025 URINE PREGNANCY TEST: CPT

## 2021-11-19 RX ADMIN — MEDROXYPROGESTERONE ACETATE 150 MG: 150 INJECTION, SUSPENSION INTRAMUSCULAR at 15:25

## 2021-11-19 ASSESSMENT — PAIN SCALES - GENERAL: PAINLEVEL: NO PAIN (0)

## 2021-11-19 NOTE — PATIENT INSTRUCTIONS
Ilir Fernandez MD:  Stress Remedy - Relaxation on the Run:  [email or web address]     Look into mindfulness training apps for your phone.  Calm and Headspace are 2 of the most common ones.

## 2021-11-19 NOTE — PROGRESS NOTES
Assessment & Plan     ASSESSMENT/ORDERS:    ICD-10-CM    1. Encounter for surveillance of injectable contraceptive  Z30.42 HCG Qual, Urine ([id number])     PLAN:  1.  Patient decided not to proceed with IUD today.  We discussed this was fine.  She will continue depo provera injections for now.  Follow-up with me if she decides she wants IUD.  She will contact me for another round of Cytotec if she wishes to proceed later.      12 minutes spent on the date of the encounter doing patient visit            Return in about 3 months (around 2/19/2022) for next depo provera.    Arcadio Malone MD  [facility]   Liz is a 21 year old who presents for the following health issues     HPI     Patient is here today for IUD placement.   Clinic Administered Medication Documentation    Administrations This Visit     medroxyPROGESTERone (DEPO-PROVERA) injection 150 mg     Admin Date  11/19/2021 Action  Given Dose  150 mg Route  Intramuscular Site  Right Ventrogluteal Administered By  Carmen Gonzalez CMA    Ordering Provider: Arcadio Malone MD    Patient Supplied?: No                  Depo Provera Documentation    URINE HCG: negative    Depo-Provera Standing Order inclusion/exclusion criteria reviewed.   Patient meets: inclusion criteria     BP: 118/70  LAST PAP/EXAM:   Lab Results   Component Value Date    PAP NIL 04/20/2021       Prior to injection, verified patient identity using patient's name and date of birth. Medication was administered. Please see MAR and medication order for additional information.     Was entire vial of medication used? Yes  Vial/Syringe: Single dose vial  Expiration Date:  04/2023    Patient instructed to remain in clinic for 15 minutes.  NEXT INJECTION DUE: 2/4/22 - 2/18/22    Carmen Gonzalez CMA        Patient here today for IUD, but has decided that she is not exactly ready for this as she may want to get pregnant in the next 6-8 months.  She does  wish to discuss this decision today and make sure she is making the right choice.      Review of Systems         Objective    /70   Pulse 120   Temp 98.1  F (36.7  C) (Temporal)   Resp 18   Wt 52.7 kg (116 lb 2 oz)   LMP  (LMP Unknown)   SpO2 98%   Breastfeeding No   BMI 21.24 kg/m    Body mass index is 21.24 kg/m .  Physical Exam

## 2022-02-16 ENCOUNTER — ALLIED HEALTH/NURSE VISIT (OUTPATIENT)
Dept: FAMILY MEDICINE | Facility: CLINIC | Age: 22
End: 2022-02-16
Payer: COMMERCIAL

## 2022-02-16 VITALS
OXYGEN SATURATION: 98 % | DIASTOLIC BLOOD PRESSURE: 64 MMHG | HEART RATE: 107 BPM | TEMPERATURE: 98.4 F | BODY MASS INDEX: 21.8 KG/M2 | WEIGHT: 119.19 LBS | SYSTOLIC BLOOD PRESSURE: 140 MMHG

## 2022-02-16 DIAGNOSIS — Z30.42 ENCOUNTER FOR SURVEILLANCE OF INJECTABLE CONTRACEPTIVE: Primary | ICD-10-CM

## 2022-02-16 PROCEDURE — 99207 PR NO CHARGE NURSE ONLY: CPT | Performed by: FAMILY MEDICINE

## 2022-02-16 PROCEDURE — 99207 PR NO CHARGE NURSE ONLY: CPT

## 2022-02-16 PROCEDURE — 96372 THER/PROPH/DIAG INJ SC/IM: CPT | Performed by: FAMILY MEDICINE

## 2022-02-16 RX ADMIN — MEDROXYPROGESTERONE ACETATE 150 MG: 150 INJECTION, SUSPENSION INTRAMUSCULAR at 13:47

## 2022-02-16 ASSESSMENT — PAIN SCALES - GENERAL: PAINLEVEL: NO PAIN (0)

## 2022-02-16 NOTE — PROGRESS NOTES
Clinic Administered Medication Documentation    Administrations This Visit     medroxyPROGESTERone (DEPO-PROVERA) injection 150 mg     Admin Date  02/16/2022 Action  Given Dose  150 mg Route  Intramuscular Site  Left Gluteus Tod Administered By  Juani Donald CMA    Ordering Provider: Arcadio Malone MD    Patient Supplied?: No                  Depo Provera Documentation    URINE HCG: not indicated    Depo-Provera Standing Order inclusion/exclusion criteria reviewed.   Patient meets: inclusion criteria     BP: 140/64  LAST PAP/EXAM:   Lab Results   Component Value Date    PAP NIL 04/20/2021       Prior to injection, verified patient identity using patient's name and date of birth. Medication was administered. Please see MAR and medication order for additional information.     Was entire vial of medication used? Yes  Vial/Syringe: Single dose vial  Expiration Date:  07/2023    Patient instructed to remain in clinic for 15 minutes and report any adverse reaction to staff immediately .  NEXT INJECTION DUE: 5/4/22 - 5/18/22

## 2022-05-24 ENCOUNTER — TELEPHONE (OUTPATIENT)
Dept: LAB | Facility: CLINIC | Age: 22
End: 2022-05-24
Payer: COMMERCIAL

## 2022-05-24 NOTE — PROGRESS NOTES
Patient has a lab appt tomorrow, for urine sample before depo shot. No order is placed as on 5/24/2022.

## 2022-07-02 ENCOUNTER — HEALTH MAINTENANCE LETTER (OUTPATIENT)
Age: 22
End: 2022-07-02

## 2022-07-15 ENCOUNTER — LAB (OUTPATIENT)
Dept: LAB | Facility: CLINIC | Age: 22
End: 2022-07-15
Payer: COMMERCIAL

## 2022-07-15 DIAGNOSIS — R76.12 POSITIVE QUANTIFERON-TB GOLD TEST: ICD-10-CM

## 2022-07-15 LAB
ALBUMIN SERPL-MCNC: 3.9 G/DL (ref 3.4–5)
ALP SERPL-CCNC: 59 U/L (ref 40–150)
ALT SERPL W P-5'-P-CCNC: 23 U/L (ref 0–50)
ANION GAP SERPL CALCULATED.3IONS-SCNC: 5 MMOL/L (ref 3–14)
AST SERPL W P-5'-P-CCNC: 12 U/L (ref 0–45)
BILIRUB SERPL-MCNC: 0.5 MG/DL (ref 0.2–1.3)
BUN SERPL-MCNC: 10 MG/DL (ref 7–30)
CALCIUM SERPL-MCNC: 8.5 MG/DL (ref 8.5–10.1)
CHLORIDE BLD-SCNC: 110 MMOL/L (ref 94–109)
CO2 SERPL-SCNC: 25 MMOL/L (ref 20–32)
CREAT SERPL-MCNC: 0.78 MG/DL (ref 0.52–1.04)
GFR SERPL CREATININE-BSD FRML MDRD: >90 ML/MIN/1.73M2
GLUCOSE BLD-MCNC: 88 MG/DL (ref 70–99)
POTASSIUM BLD-SCNC: 3.2 MMOL/L (ref 3.4–5.3)
PROT SERPL-MCNC: 7.1 G/DL (ref 6.8–8.8)
SODIUM SERPL-SCNC: 140 MMOL/L (ref 133–144)

## 2022-07-15 PROCEDURE — 80053 COMPREHEN METABOLIC PANEL: CPT

## 2022-07-15 PROCEDURE — 86481 TB AG RESPONSE T-CELL SUSP: CPT

## 2022-07-15 PROCEDURE — 36415 COLL VENOUS BLD VENIPUNCTURE: CPT

## 2022-07-18 LAB
GAMMA INTERFERON BACKGROUND BLD IA-ACNC: 0 IU/ML
M TB IFN-G BLD-IMP: POSITIVE
M TB IFN-G CD4+ BCKGRND COR BLD-ACNC: 10 IU/ML
MITOGEN IGNF BCKGRD COR BLD-ACNC: 0.83 IU/ML
MITOGEN IGNF BCKGRD COR BLD-ACNC: 1.08 IU/ML
QUANTIFERON MITOGEN: 10 IU/ML
QUANTIFERON NIL TUBE: 0 IU/ML
QUANTIFERON TB1 TUBE: 1.08 IU/ML
QUANTIFERON TB2 TUBE: 0.83

## 2022-07-22 DIAGNOSIS — Z22.7 LATENT TUBERCULOSIS INFECTION: Primary | ICD-10-CM

## 2022-08-02 ENCOUNTER — TELEPHONE (OUTPATIENT)
Dept: INFECTIOUS DISEASES | Facility: CLINIC | Age: 22
End: 2022-08-02

## 2022-08-02 NOTE — TELEPHONE ENCOUNTER
----- Message from Mary Perez MD sent at 7/29/2022  9:45 AM CDT -----  Regarding: RE: needs scheduling    Aman Mendoza,    Those ELIZABETH slots are for HIV only, designed to get HIV patients in sooner when it is needed for their care.    This is just a latent TB that wants to start therapy but not urgent, next available is fine.    Thanks!    -Mary    ----- Message -----  From: Muriel Burns RN  Sent: 7/28/2022   4:12 PM CDT  To: Mary Perez MD  Subject: FW: needs scheduling                               ----- Message -----  From: Kelly Salmon  Sent: 7/25/2022   2:26 PM CDT  To: Aurora Berkowitz RN  Subject: RE: needs scheduling                             Luca Perez,    Would you like to see the pt in one of your ELIZABETH spots at 1pm on 8/23 or see her the end of September?     Kelly King  ----- Message -----  From: Aurora Berkowitz RN  Sent: 7/25/2022   2:00 PM CDT  To: [id number]  Subject: FW: needs scheduling                               ----- Message -----  From: Mary Perez MD  Sent: 7/22/2022   5:22 PM CDT  To: [facility]  Subject: needs scheduling                                     Per mychart, patient requests follow-up appt with me, next available in person for 30 minutes.      Appt notes:  Latent tuberculosis    She will need a chest XR 1-2 days before our appointment.   Since my next available is in over a month, chest XR should just be 1-2 days before appointment, not too much earlier.      Please call patient to schedule      Thanks!

## 2022-08-30 ENCOUNTER — TELEPHONE (OUTPATIENT)
Dept: FAMILY MEDICINE | Facility: CLINIC | Age: 22
End: 2022-08-30

## 2022-08-30 NOTE — TELEPHONE ENCOUNTER
Pt requested Chest CT to be sent to [phone] and stated she is going to cancel the other one that is scheduled.

## 2022-09-19 ENCOUNTER — TELEPHONE (OUTPATIENT)
Dept: INFECTIOUS DISEASES | Facility: CLINIC | Age: 22
End: 2022-09-19

## 2022-09-19 NOTE — TELEPHONE ENCOUNTER
Called patient to discuss imaging prior to appointment tomorrow 9/20. No answer, left voicemail. Requested patient complete Chest xray this afternoon or tomorrow prior to appointment. Provided clinic call back number as well as imaging scheduling number.

## 2022-09-19 NOTE — TELEPHONE ENCOUNTER
Patient got images done at another clinic. This clinic will be sending over the images to our clinic.  Thank you.

## 2022-09-20 ENCOUNTER — OFFICE VISIT (OUTPATIENT)
Dept: INFECTIOUS DISEASES | Facility: CLINIC | Age: 22
End: 2022-09-20
Attending: INTERNAL MEDICINE
Payer: COMMERCIAL

## 2022-09-20 VITALS
OXYGEN SATURATION: 97 % | HEART RATE: 82 BPM | BODY MASS INDEX: 20.92 KG/M2 | HEIGHT: 62 IN | SYSTOLIC BLOOD PRESSURE: 106 MMHG | WEIGHT: 113.7 LBS | TEMPERATURE: 98.4 F | DIASTOLIC BLOOD PRESSURE: 71 MMHG

## 2022-09-20 DIAGNOSIS — Z22.7 LATENT TUBERCULOSIS INFECTION: Primary | ICD-10-CM

## 2022-09-20 PROCEDURE — G0463 HOSPITAL OUTPT CLINIC VISIT: HCPCS

## 2022-09-20 PROCEDURE — 99214 OFFICE O/P EST MOD 30 MIN: CPT | Performed by: INTERNAL MEDICINE

## 2022-09-20 RX ORDER — RIFAMPIN 300 MG/1
600 CAPSULE ORAL DAILY
Qty: 60 CAPSULE | Refills: 3 | Status: SHIPPED | OUTPATIENT
Start: 2022-09-20

## 2022-09-20 ASSESSMENT — PAIN SCALES - GENERAL: PAINLEVEL: NO PAIN (0)

## 2022-09-20 NOTE — NURSING NOTE
"Chief Complaint   Patient presents with     RECHECK     Vital signs:  Temp: 98.4  F (36.9  C) Temp src: Oral BP: 106/71 Pulse: 82     SpO2: 97 %     Height: 157.5 cm (5' 2\") Weight: 51.6 kg (113 lb 11.2 oz)  Estimated body mass index is 20.8 kg/m  as calculated from the following:    Height as of this encounter: 1.575 m (5' 2\").    Weight as of this encounter: 51.6 kg (113 lb 11.2 oz).        Georgina Teresa, [facility]  9/20/2022 3:51 PM        "

## 2022-09-20 NOTE — PROGRESS NOTES
"  Infectious Disease Clinic Visit - seen in person     Reason for visit:   Return visit for positive Quantiferon test    SUBJECTIVE and Interval History:    Liz returns today after her repeat Quantiferon testing returned with a positive result once again.  We had planned a short interval for a repeat test, but the first attempt yielded \"Quantity Not Sufficient.\"  Liz had some competing priorities and was just able to have it repeated this past summer.    Liz reports no new symptoms.  No cough, SOB, fever, night sweats, or unexpected weight changes.  She can get sweaty at night but she always sleeps with a down comforter, and no true night sweats, no soaking through sheets or pajamas.    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   She did the Quantiferon blood test, and it returned positive.   Liz has done TB skin tests before (including a two step test the last time), and they were negative.  She never did a TB blood test before.    Liz does not have any medical history.  Her only medication is the Depo Provera injection for contraception.  No family history of personal history of any auto-immune or auto-inflammatory diseases.    Liz did not have any interval traveling, high risk TB contacts, or any other identified TB risk factors since her last negative TB skin test.   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      I have reviewed and updated the patient's Past Medical History, Social History, Family History and Medication " "List.    OBJECTIVE:    Current Outpatient Medications   Medication    None       No Known Allergies       PHYSICAL EXAMINATION:    VITAL SIGNS: /71   Pulse 82   Temp 98.4  F (36.9  C) (Oral)   Ht 1.575 m (5' 2\")   Wt 51.6 kg (113 lb 11.2 oz)   SpO2 97%   BMI 20.80 kg/m    GENERAL:   No acute distress    LYMPH:  No cervical or axillary lymphadenopathy  LUNGS: Clear to ausculation bilaterally without any increased work of breathing  HEART: Regular rate and rhythm and no murmur, gallop or rub    EXTREMITIES: Warm and well perfused without clubbing, cyanosis, or edema  SKIN:  No rashes  NEURO:  Awake, alert, no focal neurologic deficits.  PSYCH: Affect normal. Speech fluent and appropriate.       Labs    7/15/2022 Quantiferon TB Gold Plus  - Positive    Last Comprehensive Metabolic Panel:  Sodium   Date Value Ref Range Status   07/15/2022 140 133 - 144 mmol/L Final   12/21/2012 137 133 - 143 mmol/L Final     Potassium   Date Value Ref Range Status   07/15/2022 3.2 (L) 3.4 - 5.3 mmol/L Final   04/13/2017 3.9 3.4 - 5.3 mmol/L Final     Chloride   Date Value Ref Range Status   07/15/2022 110 (H) 94 - 109 mmol/L Final   12/21/2012 102 96 - 110 mmol/L Final     Carbon Dioxide   Date Value Ref Range Status   12/21/2012 25 20 - 32 mmol/L Final     Carbon Dioxide (CO2)   Date Value Ref Range Status   07/15/2022 25 20 - 32 mmol/L Final     Anion Gap   Date Value Ref Range Status   07/15/2022 5 3 - 14 mmol/L Final   12/21/2012 11 6 - 17 mmol/L Final     Glucose   Date Value Ref Range Status   07/15/2022 88 70 - 99 mg/dL Final   12/21/2012 106 (H) 60 - 99 mg/dL Final     Urea Nitrogen   Date Value Ref Range Status   07/15/2022 10 7 - 30 mg/dL Final   12/21/2012 7 5 - 24 mg/dL Final     Creatinine   Date Value Ref Range Status   07/15/2022 0.78 0.52 - 1.04 mg/dL Final   12/21/2012 0.53 0.39 - 0.73 mg/dL Final     GFR Estimate   Date Value Ref Range Status   07/15/2022 >90 >60 mL/min/1.73m2 Final     Comment:     " Effective December 21, 2021 eGFRcr in adults is calculated using the 2021 CKD-EPI creatinine equation which includes age and gender (Rojas davidson al., NEJM, DOI: 10.1056/FGVTis8565244)   12/21/2012 GFR not calculated, patient <16 years old. mL/min/1.7m2 Final     Calcium   Date Value Ref Range Status   07/15/2022 8.5 8.5 - 10.1 mg/dL Final   12/21/2012 9.1 8.7 - 10.8 mg/dL Final     Bilirubin Total   Date Value Ref Range Status   07/15/2022 0.5 0.2 - 1.3 mg/dL Final   12/21/2012 0.3 0.2 - 1.3 mg/dL Final     Alkaline Phosphatase   Date Value Ref Range Status   07/15/2022 59 40 - 150 U/L Final   12/21/2012 155 105 - 420 U/L Final     ALT   Date Value Ref Range Status   07/15/2022 23 0 - 50 U/L Final   12/21/2012 26 0 - 50 U/L Final     AST   Date Value Ref Range Status   07/15/2022 12 0 - 45 U/L Final   12/21/2012 21 0 - 35 U/L Final     7/26/2021 - scanned into Epic record  Result of Quantiferon Gold  ([facility] employee health)  Positive    Imaging    XR CHEST 9/19/2022    Impression:  Clear lungs        XR CHEST 2 VW   7/23/2021 2:58 PM      COMPARISON: Chest x-ray 9/3/2012.                                                                   IMPRESSION: PA and lateral views of the chest. Lungs are clear. Heart  is normal in size. No effusions are evident. No pneumothorax.      ASSESSMENT and PLAN:    Positive Quantiferon Gold test    Liz does not have any identified risk factors for acquiring TB.  Now with two consecutive Quantiferon Gold tests positive.  We discussed that as there are TB infections in [place], she could have just interacted with someone in the community without her awareness.   Alternatively, this could still be a false positive due to some factor we have not identified.    We discussed the pros and cons to pursuing latent tuberculosis treatment, and Liz decided to go ahead and accept a treatment course.  Recent CMP without evidence for any underlying liver disease.    Liz is engaged and recently  stopped her DepoProvera.  She is not actively trying to get pregnant and plans to use condoms until they are ready to conceive.  She is not taking any other medications.   Rifampin is OK in pregnancy should she become pregnant regardless.    - rifampin 600mg po daily    - repeat CMP in 2-4 weeks, ok to do closer to home    - visit to check in on therapy in about 1 month, virtual OK        Mary Perez MD, MS  Infectious Disease    Time:  A total of 35 minutes was spent in care of the patient today. 25 minutes were spent in the room for history, examination and counseling, with an additional 10 minutes spent on chart review, orders, and care coordination.

## 2022-09-20 NOTE — LETTER
"9/20/2022       RE: Liz Peter  [address]  [facility] [address]     Dear Colleague,    Thank you for referring your patient, Liz Peter, to the [facility] [place] [facility]. Please see a copy of my visit note below.      Infectious Disease Clinic Visit - seen in person     Reason for visit:   Return visit for positive Quantiferon test    SUBJECTIVE and Interval History:    Liz returns today after her repeat Quantiferon testing returned with a positive result once again.  We had planned a short interval for a repeat test, but the first attempt yielded \"Quantity Not Sufficient.\"  Liz had some competing priorities and was just able to have it repeated this past summer.    Liz reports no new symptoms.  No cough, SOB, fever, night sweats, or unexpected weight changes.  She can get sweaty at night but she always sleeps with a down comforter, and no true night sweats, no soaking through sheets or pajamas.    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   She did the Quantiferon blood test, and it returned positive.   Liz has done TB skin tests before (including a two step test the last time), and they were negative.  She never did a TB blood test before.    Liz does not have any medical history.  Her only medication is the Depo Provera injection for contraception.  No family history of personal history of any auto-immune or auto-inflammatory diseases.    Liz did not have any interval traveling, high " "risk TB contacts, or any other identified TB risk factors since her last negative TB skin test.   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      I have reviewed and updated the patient's Past Medical History, Social History, Family History and Medication List.    OBJECTIVE:    Current Outpatient Medications   Medication    None       No Known Allergies       PHYSICAL EXAMINATION:    VITAL SIGNS: /71   Pulse 82   Temp 98.4  F (36.9  C) (Oral)   Ht 1.575 m (5' 2\")   Wt 51.6 kg (113 lb 11.2 oz)   SpO2 97%   BMI 20.80 kg/m    GENERAL:   No acute distress    LYMPH:  No cervical or axillary lymphadenopathy  LUNGS: Clear to ausculation bilaterally without any increased work of breathing  HEART: Regular rate and rhythm and no murmur, gallop or rub    EXTREMITIES: Warm and well perfused without clubbing, cyanosis, or edema  SKIN:  No rashes  NEURO:  Awake, alert, no focal neurologic deficits.  PSYCH: Affect normal. Speech fluent and appropriate.       Labs    7/15/2022 Quantiferon TB Gold Plus  - Positive    Last Comprehensive Metabolic Panel:  Sodium   Date Value Ref Range Status   07/15/2022 140 133 - 144 mmol/L Final   12/21/2012 137 133 - 143 mmol/L Final     Potassium   Date Value Ref Range Status   07/15/2022 3.2 (L) 3.4 - 5.3 mmol/L Final   04/13/2017 3.9 3.4 - 5.3 mmol/L Final     Chloride   Date Value Ref Range Status   07/15/2022 110 (H) 94 - 109 mmol/L Final   12/21/2012 102 96 - 110 mmol/L Final     Carbon Dioxide   Date Value Ref Range Status   12/21/2012 25 20 - 32 mmol/L Final     Carbon Dioxide (CO2)   Date Value Ref Range Status   07/15/2022 25 20 - 32 mmol/L Final     Anion Gap   Date Value Ref Range Status   07/15/2022 5 3 - 14 mmol/L Final   12/21/2012 11 6 - 17 mmol/L Final     Glucose   Date Value Ref Range Status   07/15/2022 88 70 - 99 mg/dL Final   12/21/2012 106 (H) 60 - 99 mg/dL Final     Urea Nitrogen   Date Value Ref Range " Status   07/15/2022 10 7 - 30 mg/dL Final   12/21/2012 7 5 - 24 mg/dL Final     Creatinine   Date Value Ref Range Status   07/15/2022 0.78 0.52 - 1.04 mg/dL Final   12/21/2012 0.53 0.39 - 0.73 mg/dL Final     GFR Estimate   Date Value Ref Range Status   07/15/2022 >90 >60 mL/min/1.73m2 Final     Comment:     Effective December 21, 2021 eGFRcr in adults is calculated using the 2021 CKD-EPI creatinine equation which includes age and gender (Rojas et al., NEJM, DOI: 10.1056/FHZJsp7815088)   12/21/2012 GFR not calculated, patient <16 years old. mL/min/1.7m2 Final     Calcium   Date Value Ref Range Status   07/15/2022 8.5 8.5 - 10.1 mg/dL Final   12/21/2012 9.1 8.7 - 10.8 mg/dL Final     Bilirubin Total   Date Value Ref Range Status   07/15/2022 0.5 0.2 - 1.3 mg/dL Final   12/21/2012 0.3 0.2 - 1.3 mg/dL Final     Alkaline Phosphatase   Date Value Ref Range Status   07/15/2022 59 40 - 150 U/L Final   12/21/2012 155 105 - 420 U/L Final     ALT   Date Value Ref Range Status   07/15/2022 23 0 - 50 U/L Final   12/21/2012 26 0 - 50 U/L Final     AST   Date Value Ref Range Status   07/15/2022 12 0 - 45 U/L Final   12/21/2012 21 0 - 35 U/L Final     7/26/2021 - scanned into Epic record  Result of Quantiferon Gold  ([facility] employee health)  Positive    Imaging    XR CHEST 9/19/2022    Impression:  Clear lungs        XR CHEST 2 VW   7/23/2021 2:58 PM      COMPARISON: Chest x-ray 9/3/2012.                                                                   IMPRESSION: PA and lateral views of the chest. Lungs are clear. Heart  is normal in size. No effusions are evident. No pneumothorax.      ASSESSMENT and PLAN:    Positive Quantiferon Gold test    Liz does not have any identified risk factors for acquiring TB.  Now with two consecutive Quantiferon Gold tests positive.  We discussed that as there are TB infections in [place], she could have just interacted with someone in the community without her awareness.   Alternatively, this  could still be a false positive due to some factor we have not identified.    We discussed the pros and cons to pursuing latent tuberculosis treatment, and Liz decided to go ahead and accept a treatment course.  Recent CMP without evidence for any underlying liver disease.    Liz is engaged and recently stopped her DepoProvera.  She is not actively trying to get pregnant and plans to use condoms until they are ready to conceive.  She is not taking any other medications.   Rifampin is OK in pregnancy should she become pregnant regardless.    - rifampin 600mg po daily    - repeat CMP in 2-4 weeks, ok to do closer to home    - visit to check in on therapy in about 1 month, virtual OK        Mary Perez MD, MS  Infectious Disease    Time:  A total of 35 minutes was spent in care of the patient today. 25 minutes were spent in the room for history, examination and counseling, with an additional 10 minutes spent on chart review, orders, and care coordination.

## 2022-10-03 ENCOUNTER — MYC MEDICAL ADVICE (OUTPATIENT)
Dept: INFECTIOUS DISEASES | Facility: CLINIC | Age: 22
End: 2022-10-03

## 2022-10-25 ENCOUNTER — TELEPHONE (OUTPATIENT)
Dept: INFECTIOUS DISEASES | Facility: CLINIC | Age: 22
End: 2022-10-25

## 2022-10-25 NOTE — TELEPHONE ENCOUNTER
[facility] Call Center    Phone Message    May a detailed message be left on voicemail: yes     Reason for Call: Other: .      Per Patient is wanting to get a call back. Patient states she was not aware the lab orders were sent over and was wondering if the appt today needs to be rescheduled to a different day when patient has had the lab word one. Patient states if she is needing to keep the appt today if the appt can be switched to a virtual appt. Please advise.     Action Taken: Message routed to:  [facility] ID    Travel Screening: Not Applicable

## 2022-10-25 NOTE — TELEPHONE ENCOUNTER
Patient did not receive mychart and voicemail letting her know labs were sent. Patient to get labs done ASAP and scheduled for dr. Perez's next available.

## 2022-11-15 ENCOUNTER — VIRTUAL VISIT (OUTPATIENT)
Dept: INFECTIOUS DISEASES | Facility: CLINIC | Age: 22
End: 2022-11-15
Attending: INTERNAL MEDICINE
Payer: COMMERCIAL

## 2022-11-15 DIAGNOSIS — Z22.7 LATENT TUBERCULOSIS: Primary | ICD-10-CM

## 2022-11-15 PROCEDURE — 99214 OFFICE O/P EST MOD 30 MIN: CPT | Mod: 95 | Performed by: INTERNAL MEDICINE

## 2022-11-15 PROCEDURE — G0463 HOSPITAL OUTPT CLINIC VISIT: HCPCS | Mod: PN,GT | Performed by: INTERNAL MEDICINE

## 2022-11-15 NOTE — PROGRESS NOTES
Liz Peter is a 22 year old woman who is being evaluated via a billable video visit.      How would you like to obtain your AVS? MyChart  If the video visit is dropped, the invitation should be resent by: Text to cell phone: [phone]  Will anyone else be joining your video visit? No        Infectious Disease Video Visit    Reason for visit:   Return visit for latent tuberculosis infection (LTBI)    SUBJECTIVE and Interval History:    Liz is seen today on video for LTBI follow-up.  She reports no intolerance of the rifampin.  She does notice the red/orange color to her urine and stools.  She takes rifampin in the morning and notices that the color does start to clear later in the evening.   She has not missed any doses.  A couple of times she forgot in the morning and took it later in the day.    She is not using Depo Provera or any other hormonal contraception.  She and kary are relying on barrier protection for now.    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).      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   She did the Quantiferon blood test, and it returned positive.   Liz has done TB skin tests before (including a two step test the last time), and they were negative.  She never did a TB blood test before.    Liz does not have any medical history.  Her only medication is the Depo Provera injection for contraception.  No family history of personal history of any auto-immune or auto-inflammatory diseases.    Liz did not have any interval traveling, high risk TB contacts, or any other identified TB risk factors since her last negative TB skin  test.   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      I have reviewed and updated the patient's Past Medical History, Social History, Family History and Medication List.    OBJECTIVE:    Medications:    Current Outpatient Medications   Medication     rifampin (RIFADIN) 300 MG capsule     Allergies:    No Known Allergies         Examination via video visit:     GENERAL: Patient appears well and is not in any acute distress    HEENT: The eyes do not appear to have any icterus or injection.  EOM appear intact.  RESPIRATORY:  No cough or labored breathing is noted.  SKIN:  No rashes are visible  NEURO:  Awake, alert, no focal neurologic deficits are noted.  PSYCH: Affect is bright. Speech fluent and appropriate.      The rest of a comprehensive physical examination is deferred due to the public health emergency video visit restrictions.      Labs    10/27/2022 CMP done at outside facility (results scanned in)  Reviewed and there are no abnormalities    7/15/2022 Quantiferon TB Gold Plus  - Positive    7/26/2021 - scanned into Epic record  Result of Quantiferon Gold  ([facility])  Positive    Imaging    XR CHEST 9/19/2022    Impression:  Clear lungs        XR CHEST 2 VW   7/23/2021 2:58 PM      COMPARISON: Chest x-ray 9/3/2012.                                                                   IMPRESSION: PA and lateral views of the chest. Lungs are clear. Heart  is normal in size. No effusions are evident. No pneumothorax.      ASSESSMENT and PLAN:    Latent tuberculosis infection (LTBI)    Liz does not have any identified risk factors for acquiring TB.  However she did have two consecutive Quantiferon Gold tests positive.  We discussed that as there are TB infections in [place], she could have just interacted with someone in the community without her awareness.   Alternatively, this could still be a false positive due to some factor we have  not identified.    We discussed the pros and cons to pursuing latent tuberculosis treatment, and Liz decided to go ahead and accept a treatment course.  Recent CMP without evidence for any underlying liver disease.    Liz is engaged and recently stopped her DepoProvera.  She is not actively trying to get pregnant and plans to use condoms until they are ready to conceive.  She is not taking any other medications.   Rifampin is OK in pregnancy should she become pregnant regardless.    CMP repeated 10/27/2022 without any evidence for rifampin toxicity.  Liz is a healthy young woman and is tolerating rifampin well.   We decided together that no further laboratory monitoring was necessary, but Liz will monitor for signs/symptoms of liver dysfunction and will report this to me right away.      - continue rifampin 600mg po daily to complete a 4 month course    - visit to complete therapy 1/10/2023 at 1:30pm, appt was made      Video-Visit Details    Type of service:  Video Visit    Video Start Time: 1:20 PM  Video End Time:  1:28 PM    Originating Location (pt. Location): Home        Distant Location (provider location):  On-site    Platform used for Video Visit: Flip Perez MD, MS  Infectious Disease    MDM billing:  Level 4  One stable problem  Reviewed >3 lab results  Prescription medication management

## 2022-11-15 NOTE — Clinical Note
11/15/2022       RE: Liz Peter  [address]  [facility] [address]     Dear Colleague,    Thank you for referring your patient, Liz Peter, to the [facility] [place] [facility]. Please see a copy of my visit note below.      Liz Peter is a 22 year old woman who is being evaluated via a billable video visit.      How would you like to obtain your AVS? MyChart  If the video visit is dropped, the invitation should be resent by: Text to cell phone: [phone]  Will anyone else be joining your video visit? No  {If patient encounters technical issues they should call [phone] :[id number]}      Video-Visit Details    Type of service:  Video Visit    Video Start Time: 1:20 PM  Video End Time:  1:28 PM    Originating Location (pt. Location): Home    {PROVIDER LOCATION On-site should be selected for visits conducted from your clinic location or adjoining [facility] hospital, academic office, or other nearby [facility] building. Off-site should be selected for all other provider locations, including home:[id number]}    Distant Location (provider location):  On-site    Platform used for Video Visit: Flip        Again, thank you for allowing me to participate in the care of your patient.      Sincerely,    Mary Perez MD

## 2022-11-15 NOTE — LETTER
Date:December 1, 2022      Provider requested that no letter be sent. Do not send.       [facility]

## 2022-12-12 ENCOUNTER — TELEPHONE (OUTPATIENT)
Dept: FAMILY MEDICINE | Facility: CLINIC | Age: 22
End: 2022-12-12

## 2022-12-12 NOTE — TELEPHONE ENCOUNTER
Message left to return call to clinic inregards to her request for sooner appointment. Offer the 8:40 am with Faisal on Friday, 12/16 if still available. Mariaelena Davis LPN

## 2022-12-12 NOTE — TELEPHONE ENCOUNTER
Patient returned our call.  I read her the note to schedule with Dr. Malone on Friday.  Patient declined and said that she already made an appointment at a clinic in [place].    Ninfa KOENIG     [facility]

## 2022-12-12 NOTE — TELEPHONE ENCOUNTER
Reason for Call:  Other appointment    Detailed comments: Patient has an appointment January 6th for a lump in right breast and tenderness and would like to be seen sooner if possible    Phone Number Patient can be reached at: Cell number on file:    Telephone Information:   Mobile [phone]       Best Time: Anytime    Can we leave a detailed message on this number? YES    Call taken on 12/12/2022 at 1:25 PM by Eleni Perez

## 2023-02-01 ENCOUNTER — LAB REQUISITION (OUTPATIENT)
Dept: LAB | Facility: CLINIC | Age: 23
End: 2023-02-01

## 2023-02-01 PROCEDURE — G0145 SCR C/V CYTO,THINLAYER,RESCR: HCPCS

## 2023-02-01 PROCEDURE — 87624 HPV HI-RISK TYP POOLED RSLT: CPT

## 2023-02-06 LAB
BKR LAB AP GYN ADEQUACY: NORMAL
BKR LAB AP GYN INTERPRETATION: NORMAL
BKR LAB AP HPV REFLEX: NORMAL
BKR LAB AP PREVIOUS ABNORMAL: NORMAL
PATH REPORT.COMMENTS IMP SPEC: NORMAL
PATH REPORT.COMMENTS IMP SPEC: NORMAL
PATH REPORT.RELEVANT HX SPEC: NORMAL

## 2023-02-08 LAB
HUMAN PAPILLOMA VIRUS 16 DNA: NEGATIVE
HUMAN PAPILLOMA VIRUS 18 DNA: NEGATIVE
HUMAN PAPILLOMA VIRUS FINAL DIAGNOSIS: NORMAL
HUMAN PAPILLOMA VIRUS OTHER HR: NEGATIVE

## 2023-07-15 ENCOUNTER — HEALTH MAINTENANCE LETTER (OUTPATIENT)
Age: 23
End: 2023-07-15

## 2024-09-07 ENCOUNTER — HEALTH MAINTENANCE LETTER (OUTPATIENT)
Age: 24
End: 2024-09-07